# Patient Record
Sex: FEMALE | Race: BLACK OR AFRICAN AMERICAN | Employment: UNEMPLOYED | URBAN - METROPOLITAN AREA
[De-identification: names, ages, dates, MRNs, and addresses within clinical notes are randomized per-mention and may not be internally consistent; named-entity substitution may affect disease eponyms.]

---

## 2017-02-06 ENCOUNTER — HOSPITAL ENCOUNTER (EMERGENCY)
Age: 42
Discharge: HOME | End: 2017-02-06
Attending: EMERGENCY MEDICINE
Payer: SELF-PAY

## 2017-02-06 VITALS
TEMPERATURE: 98.4 F | RESPIRATION RATE: 18 BRPM | SYSTOLIC BLOOD PRESSURE: 144 MMHG | DIASTOLIC BLOOD PRESSURE: 92 MMHG | WEIGHT: 214 LBS | HEART RATE: 89 BPM | BODY MASS INDEX: 39.38 KG/M2 | HEIGHT: 62 IN | OXYGEN SATURATION: 99 %

## 2017-02-06 DIAGNOSIS — L08.9 SKIN INFECTION: Primary | ICD-10-CM

## 2017-02-06 DIAGNOSIS — L24.9 IRRITANT CONTACT DERMATITIS, UNSPECIFIED TRIGGER: ICD-10-CM

## 2017-02-06 DIAGNOSIS — K02.9 INFECTED DENTAL CARIES: ICD-10-CM

## 2017-02-06 DIAGNOSIS — K04.7 INFECTED DENTAL CARIES: ICD-10-CM

## 2017-02-06 PROCEDURE — 99282 EMERGENCY DEPT VISIT SF MDM: CPT

## 2017-02-06 RX ORDER — TRIAMCINOLONE ACETONIDE 1 MG/G
OINTMENT TOPICAL 2 TIMES DAILY
Qty: 30 G | Refills: 0 | Status: SHIPPED | OUTPATIENT
Start: 2017-02-06 | End: 2017-08-13

## 2017-02-06 RX ORDER — DIFLUNISAL 500 MG/1
500 TABLET, FILM COATED ORAL 2 TIMES DAILY
Qty: 20 TAB | Refills: 0 | Status: SHIPPED | OUTPATIENT
Start: 2017-02-06 | End: 2017-08-13

## 2017-02-06 RX ORDER — AMOXICILLIN 875 MG/1
875 TABLET, FILM COATED ORAL 2 TIMES DAILY
Qty: 20 TAB | Refills: 0 | Status: SHIPPED | OUTPATIENT
Start: 2017-02-06 | End: 2017-02-16

## 2017-02-07 NOTE — DISCHARGE INSTRUCTIONS
Dermatitis: Care Instructions  Your Care Instructions  Dermatitis is the general name used for any rash or inflammation of the skin. Different kinds of dermatitis cause different kinds of rashes. Common causes of a rash include new medicines, plants (such as poison oak or poison ivy), heat, and stress. Certain illnesses can also cause a rash. An allergic reaction to something that touches your skin, such as latex, nickel, or poison ivy, is called contact dermatitis. Contact dermatitis may also be caused by something that irritates the skin, such as bleach, a chemical, or soap. These types of rashes cannot be spread from person to person. How long your rash will last depends on what caused it. Rashes may last a few days or months. Follow-up care is a key part of your treatment and safety. Be sure to make and go to all appointments, and call your doctor if you are having problems. It's also a good idea to know your test results and keep a list of the medicines you take. How can you care for yourself at home? · Do not scratch the rash. Cut your nails short, and file them smooth. Or wear gloves if this helps keep you from scratching. · Wash the area with water only. Pat dry. · Put cold, wet cloths on the rash to reduce itching. · Keep cool, and stay out of the sun. · Leave the rash open to the air as much as possible. · If the rash itches, use hydrocortisone cream. Follow the directions on the label. Calamine lotion may help for plant rashes. · Take an over-the-counter antihistamine, such as diphenhydramine (Benadryl) or loratadine (Claritin), to help calm the itching. Read and follow all instructions on the label. · If your doctor prescribed a cream, use it as directed. If your doctor prescribed medicine, take it exactly as directed. When should you call for help?   Call your doctor now or seek immediate medical care if:  · You have symptoms of infection, such as:  ¨ Increased pain, swelling, warmth, or redness. ¨ Red streaks leading from the area. ¨ Pus draining from the area. ¨ A fever. · You have joint pain along with the rash. Watch closely for changes in your health, and be sure to contact your doctor if:  · Your rash is changing or getting worse. · You are not getting better as expected. Where can you learn more? Go to http://chencho-linda.info/. Enter [phone] in the search box to learn more about \"Dermatitis: Care Instructions. \"  Current as of: May 10, 2016  Content Version: 11.1  © 6429-2295 [employer]. Care instructions adapted under license by [employer] (which disclaims liability or warranty for this information). If you have questions about a medical condition or this instruction, always ask your healthcare professional. [place] [address] any warranty or liability for your use of this information. Tooth Decay: Care Instructions  Your Care Instructions    Tooth decay is damage to a tooth caused by plaque. Plaque is a thin film of bacteria that sticks to the teeth above and below the gum line. If plaque isn't removed from the teeth, it can build up and harden into tartar. The bacteria in plaque and tartar use sugars in food to make acids. These acids can cause tooth decay and gum disease. Any part of your tooth can decay, from the roots below the gum line to the chewing surface. Decay can affect the outer layer (enamel) or inner layer (dentin) of your teeth. The deeper the decay, the worse the damage. Untreated tooth decay will get worse and may lead to tooth loss. If you have a small hole (cavity) in your tooth, your dentist can repair it by removing the decay and filling the hole. If you have deeper decay, you may need more treatment. A very badly damaged tooth may have to be removed. Follow-up care is a key part of your treatment and safety.  Be sure to make and go to all appointments, and call your dentist if you are having problems. It's also a good idea to know your test results and keep a list of the medicines you take. How can you care for yourself at home? If you have pain:  · Take an over-the-counter pain medicine, such as acetaminophen (Tylenol), ibuprofen (Advil, Motrin), or naproxen (Aleve). Be safe with medicines. Read and follow all instructions on the label. ¨ Do not take two or more pain medicines at the same time unless the doctor told you to. Many pain medicines have acetaminophen, which is Tylenol. Too much acetaminophen (Tylenol) can be harmful. · Put ice or a cold pack on your cheek over the tooth for 10 to 15 minutes at a time. Put a thin cloth between the ice and your skin. To prevent tooth decay  · Brush teeth twice a day, and floss once a day. Brushing with fluoride toothpaste and flossing may be enough to reverse early decay. · Use a toothbrush with soft, rounded-end bristles and a head that is small enough to reach all parts of your teeth and mouth. Replace your toothbrush every 3 or 4 months. You may also use an electric toothbrush that has rotating and oscillating (back-and-forth) action. · Ask your dentist about having fluoride treatments at the dental office. · Brush your tongue to help get rid of bacteria. · Eat healthy foods that include whole grains, vegetables, and fruits. · Have your teeth cleaned by a professional at least two times a year. · Do not smoke or use smokeless tobacco. Tobacco can make tooth decay worse. When should you call for help? Call your dentist now or seek immediate medical care if:  · You have signs of infection, such as:  ¨ Increased pain, swelling, warmth, or redness. ¨ Red streaks on the gum leading from a tooth. ¨ Pus draining from the gum around a tooth. ¨ A fever. · You have a toothache. Watch closely for changes in your health, and be sure to contact your dentist if you have any problems. Where can you learn more?   Go to http://chencho-linda.info/. Enter A373 in the search box to learn more about \"Tooth Decay: Care Instructions. \"  Current as of: August 9, 2016  Content Version: 11.1  © 4117-8597 [employer]. Care instructions adapted under license by [employer] (which disclaims liability or warranty for this information). If you have questions about a medical condition or this instruction, always ask your healthcare professional. [place] [address] any warranty or liability for your use of this information.

## 2017-02-07 NOTE — ED NOTES
Reports left lower dental pain. First available appt at her dentist Maximo Stahl) is March 12th.   Also reports rash on buttocks x2 days

## 2017-02-07 NOTE — ED PROVIDER NOTES
Patient is a 35 y.o. female presenting with dental problem and rash. The history is provided by the patient. No  was used. Dental Pain    This is a new problem. The problem occurs daily. The pain is located in the left lower mouth. The quality of the pain is aching. The pain is at a severity of 7/10. The pain is moderate. There was no swelling. She has tried nothing for the symptoms. Rash    This is a new problem. The current episode started 2 days ago. The problem has been gradually worsening. There has been no fever. The rash is present on the left buttock. The patient is experiencing no pain. Associated symptoms include itching. She has tried nothing and anti-itch cream for the symptoms. The treatment provided no relief. Past Medical History:   Diagnosis Date    Infectious disease      hepatitis C       History reviewed. No pertinent past surgical history. History reviewed. No pertinent family history. Social History     Social History    Marital status: SINGLE     Spouse name: N/A    Number of children: N/A    Years of education: N/A     Occupational History    Not on file. Social History Main Topics    Smoking status: Current Every Day Smoker    Smokeless tobacco: Not on file    Alcohol use No    Drug use: Yes     Special: Heroin, Cocaine      Comment: Last used 10/28/2015    Sexual activity: Not on file     Other Topics Concern    Not on file     Social History Narrative         ALLERGIES: Lortab [hydrocodone-acetaminophen] and Tramadol    Review of Systems   Constitutional: Negative for fatigue and fever. HENT: Positive for dental problem. Respiratory: Negative for shortness of breath and wheezing. Cardiovascular: Negative for chest pain and palpitations. Gastrointestinal: Negative for abdominal pain. Musculoskeletal: Negative for arthralgias, myalgias, neck pain and neck stiffness. Skin: Positive for itching and rash. Negative for pallor. Neurological: Negative for dizziness, tremors, weakness and headaches. Hematological: Negative for adenopathy. Psychiatric/Behavioral: Negative for agitation and behavioral problems. All other systems reviewed and are negative. Vitals:    02/06/17 1720   BP: (!) 144/92   Pulse: 89   Resp: 18   Temp: 98.4 °F (36.9 °C)   SpO2: 99%   Weight: 97.1 kg (214 lb)   Height: 5' 2\" (1.575 m)            Physical Exam   Constitutional: She is oriented to person, place, and time. She appears well-developed and well-nourished. No distress. HENT:   Head: Normocephalic and atraumatic. Right Ear: External ear normal.   Left Ear: External ear normal.   Nose: Nose normal.   Mouth/Throat: Oropharynx is clear and moist.       Decay to dentine fractured enamel   Eyes: Conjunctivae are normal.   Neck: Normal range of motion. Neck supple. Cardiovascular: Normal rate, regular rhythm and normal heart sounds. Pulmonary/Chest: Effort normal and breath sounds normal. No respiratory distress. She has no wheezes. Abdominal: Soft. Bowel sounds are normal. There is no tenderness. Musculoskeletal: Normal range of motion. Lymphadenopathy:     She has no cervical adenopathy. Neurological: She is alert and oriented to person, place, and time. No cranial nerve deficit. Coordination normal.   Skin: Skin is warm and dry. Rash noted. Psychiatric: She has a normal mood and affect. Her behavior is normal. Judgment and thought content normal.   Nursing note and vitals reviewed.        MDM  Number of Diagnoses or Management Options  Diagnosis management comments: DDX contact dermatitis skin infection tinea corporis infected dental caries dental abscess       Amount and/or Complexity of Data Reviewed  Discuss the patient with other providers: yes      ED Course       Procedures    IMPRESSION  Contact dermatitis   Infected dental caries  PLAN  Amoxicillin  Naprosyn  triamcinalone  Follow up PCP

## 2017-08-13 ENCOUNTER — HOSPITAL ENCOUNTER (EMERGENCY)
Age: 42
Discharge: HOME | End: 2017-08-13
Attending: INTERNAL MEDICINE
Payer: SELF-PAY

## 2017-08-13 VITALS
SYSTOLIC BLOOD PRESSURE: 117 MMHG | BODY MASS INDEX: 32.76 KG/M2 | RESPIRATION RATE: 18 BRPM | HEIGHT: 62 IN | OXYGEN SATURATION: 99 % | HEART RATE: 87 BPM | DIASTOLIC BLOOD PRESSURE: 99 MMHG | WEIGHT: 178 LBS | TEMPERATURE: 98.6 F

## 2017-08-13 DIAGNOSIS — B18.2 CRYOGLOBULINEMIA DUE TO CHRONIC HEPATITIS C (HCC): ICD-10-CM

## 2017-08-13 DIAGNOSIS — D89.1 CRYOGLOBULINEMIA DUE TO CHRONIC HEPATITIS C (HCC): ICD-10-CM

## 2017-08-13 DIAGNOSIS — D50.9 IRON DEFICIENCY ANEMIA, UNSPECIFIED IRON DEFICIENCY ANEMIA TYPE: Primary | ICD-10-CM

## 2017-08-13 DIAGNOSIS — G62.9 NEUROPATHY: ICD-10-CM

## 2017-08-13 DIAGNOSIS — D89.1 CRYOGLOBULINEMIC VASCULITIS (HCC): ICD-10-CM

## 2017-08-13 LAB
ALBUMIN SERPL BCP-MCNC: 2.8 G/DL (ref 3.5–5)
ALBUMIN/GLOB SERPL: 0.7 {RATIO} (ref 1.1–2.2)
ALP SERPL-CCNC: 93 U/L (ref 45–117)
ALT SERPL-CCNC: 17 U/L (ref 12–78)
AMPHET UR QL SCN: NEGATIVE
ANION GAP BLD CALC-SCNC: 6 MMOL/L (ref 5–15)
APPEARANCE UR: CLEAR
AST SERPL W P-5'-P-CCNC: 21 U/L (ref 15–37)
BACTERIA URNS QL MICRO: NEGATIVE /HPF
BARBITURATES UR QL SCN: NEGATIVE
BASOPHILS # BLD AUTO: 0.1 K/UL (ref 0–0.1)
BASOPHILS # BLD: 1 % (ref 0–1)
BENZODIAZ UR QL: NEGATIVE
BILIRUB SERPL-MCNC: 0.4 MG/DL (ref 0.2–1)
BILIRUB UR QL CFM: NEGATIVE
BUN SERPL-MCNC: 9 MG/DL (ref 6–20)
BUN/CREAT SERPL: 11 (ref 12–20)
CALCIUM SERPL-MCNC: 8.2 MG/DL (ref 8.5–10.1)
CANNABINOIDS UR QL SCN: NEGATIVE
CHLORIDE SERPL-SCNC: 102 MMOL/L (ref 97–108)
CO2 SERPL-SCNC: 31 MMOL/L (ref 21–32)
COCAINE UR QL SCN: POSITIVE
COLOR UR: ABNORMAL
CREAT SERPL-MCNC: 0.79 MG/DL (ref 0.55–1.02)
DIFFERENTIAL METHOD BLD: ABNORMAL
DRUG SCRN COMMENT,DRGCM: ABNORMAL
EOSINOPHIL # BLD: 0.3 K/UL (ref 0–0.4)
EOSINOPHIL NFR BLD: 3 % (ref 0–7)
EPITH CASTS URNS QL MICRO: NORMAL /LPF
ERYTHROCYTE [DISTWIDTH] IN BLOOD BY AUTOMATED COUNT: 21.6 % (ref 11.5–14.5)
GLOBULIN SER CALC-MCNC: 4.1 G/DL (ref 2–4)
GLUCOSE SERPL-MCNC: 91 MG/DL (ref 65–100)
GLUCOSE UR STRIP.AUTO-MCNC: NEGATIVE MG/DL
HCT VFR BLD AUTO: 29.4 % (ref 35–47)
HGB BLD-MCNC: 8.7 G/DL (ref 11.5–16)
HGB UR QL STRIP: NEGATIVE
KETONES UR QL STRIP.AUTO: ABNORMAL MG/DL
LEUKOCYTE ESTERASE UR QL STRIP.AUTO: ABNORMAL
LYMPHOCYTES # BLD AUTO: 24 % (ref 12–49)
LYMPHOCYTES # BLD: 2.6 K/UL (ref 0.8–3.5)
MAGNESIUM SERPL-MCNC: 2 MG/DL (ref 1.6–2.4)
MCH RBC QN AUTO: 19.7 PG (ref 26–34)
MCHC RBC AUTO-ENTMCNC: 29.6 G/DL (ref 30–36.5)
MCV RBC AUTO: 66.5 FL (ref 80–99)
METHADONE UR QL: NEGATIVE
MONOCYTES # BLD: 0.8 K/UL (ref 0–1)
MONOCYTES NFR BLD AUTO: 7 % (ref 5–13)
NEUTS SEG # BLD: 6.9 K/UL (ref 1.8–8)
NEUTS SEG NFR BLD AUTO: 65 % (ref 32–75)
NITRITE UR QL STRIP.AUTO: NEGATIVE
OPIATES UR QL: POSITIVE
PCP UR QL: NEGATIVE
PH UR STRIP: 5.5 [PH] (ref 5–8)
PLATELET # BLD AUTO: 400 K/UL (ref 150–400)
POTASSIUM SERPL-SCNC: 2.8 MMOL/L (ref 3.5–5.1)
PROT SERPL-MCNC: 6.9 G/DL (ref 6.4–8.2)
PROT UR STRIP-MCNC: ABNORMAL MG/DL
RBC # BLD AUTO: 4.42 M/UL (ref 3.8–5.2)
RBC #/AREA URNS HPF: NORMAL /HPF (ref 0–5)
RBC MORPH BLD: ABNORMAL
RBC MORPH BLD: ABNORMAL
SODIUM SERPL-SCNC: 139 MMOL/L (ref 136–145)
SP GR UR REFRACTOMETRY: 1.02 (ref 1–1.03)
UROBILINOGEN UR QL STRIP.AUTO: 1 EU/DL (ref 0.2–1)
WBC # BLD AUTO: 10.7 K/UL (ref 3.6–11)
WBC URNS QL MICRO: NORMAL /HPF (ref 0–4)

## 2017-08-13 PROCEDURE — 99283 EMERGENCY DEPT VISIT LOW MDM: CPT

## 2017-08-13 PROCEDURE — 36415 COLL VENOUS BLD VENIPUNCTURE: CPT | Performed by: INTERNAL MEDICINE

## 2017-08-13 PROCEDURE — 83735 ASSAY OF MAGNESIUM: CPT | Performed by: INTERNAL MEDICINE

## 2017-08-13 PROCEDURE — 80053 COMPREHEN METABOLIC PANEL: CPT | Performed by: INTERNAL MEDICINE

## 2017-08-13 PROCEDURE — 74011250637 HC RX REV CODE- 250/637: Performed by: INTERNAL MEDICINE

## 2017-08-13 PROCEDURE — 85025 COMPLETE CBC W/AUTO DIFF WBC: CPT | Performed by: INTERNAL MEDICINE

## 2017-08-13 PROCEDURE — 81001 URINALYSIS AUTO W/SCOPE: CPT | Performed by: INTERNAL MEDICINE

## 2017-08-13 PROCEDURE — 80307 DRUG TEST PRSMV CHEM ANLYZR: CPT | Performed by: INTERNAL MEDICINE

## 2017-08-13 RX ORDER — GABAPENTIN 100 MG/1
100 CAPSULE ORAL 3 TIMES DAILY
Qty: 30 CAP | Refills: 0 | Status: ON HOLD | OUTPATIENT
Start: 2017-08-13 | End: 2021-06-09

## 2017-08-13 RX ORDER — POTASSIUM CHLORIDE 750 MG/1
40 TABLET, FILM COATED, EXTENDED RELEASE ORAL
Status: COMPLETED | OUTPATIENT
Start: 2017-08-13 | End: 2017-08-13

## 2017-08-13 RX ADMIN — POTASSIUM CHLORIDE 40 MEQ: 750 TABLET, FILM COATED, EXTENDED RELEASE ORAL at 11:14

## 2017-08-13 NOTE — ED NOTES
Assumed care of patient. Patient alert and oriented x4. Patient reports bilateral foot pain with swelling and numbness x1 month. Denies injury. Patient states \"I think I have sugar\". Patient denies any history of diabetes. Patient also reports urinary frequency x3 wks. Denies urinary pain. Patient reports daily use of heroine and cocaine and states last use was yesterday. Patient denies any other ocmplaints at this time. Emergency Department Nursing Plan of Care       The Nursing Plan of Care is developed from the Nursing assessment and Emergency Department Attending provider initial evaluation. The plan of care may be reviewed in the ED Provider note.     The Plan of Care was developed with the following considerations:   Patient / Family readiness to learn indicated by:verbalized understanding  Persons(s) to be included in education: patient  Barriers to Learning/Limitations:No    Signed     Rosalind Mancera RN    8/13/2017   9:11 AM

## 2017-08-13 NOTE — ED NOTES
Discharge instructions and 0 prescriptions reviewed with patient. Patient verbalizes understanding and denies any questions. Patient alert and oriented and ambulatory out of ED in no apparent distress. Patient declined wheelchair.

## 2017-08-13 NOTE — ED PROVIDER NOTES
HPI Comments: June Sy is a 38 y.o. female with PMhx significant for Hepatitis C who presents ambulatory to the ED with cc of constant bilateral feet swelling and numbness x 1 month. She states she started having sharp pains in her bilateral feet that intermittently wakes her from sleep, prompting her ED visit. Pt also c/o of orange urine, but states she drinks plenty of fluids and denies any dysuria. She states she [facility] being [facility] for 10 months and notes she had regular blood work done while there. She denies any known hx of DM, gout, or poor circulation. Pt states she is currently on her menstrual cycle and denies any chance of pregnancy as she is not sexually active. Pt reports regular tobacco use, EtOH use, cocaine use, and IV heroin use in bilateral hands and arms. She notes she would like to stop using illicit drugs. PCP: None    There are no other complaints, changes or physical findings at this time. The history is provided by the patient. Past Medical History:   Diagnosis Date    Infectious disease     hepatitis C       History reviewed. No pertinent surgical history. History reviewed. No pertinent family history. Social History     Social History    Marital status: SINGLE     Spouse name: N/A    Number of children: N/A    Years of education: N/A     Occupational History    Not on file. Social History Main Topics    Smoking status: Current Every Day Smoker     Packs/day: 1.50    Smokeless tobacco: Not on file    Alcohol use No    Drug use: Yes     Special: Heroin, Cocaine      Comment: Last used 8/12    Sexual activity: Not on file     Other Topics Concern    Not on file     Social History Narrative         ALLERGIES: Lortab [hydrocodone-acetaminophen] and Tramadol    Review of Systems   Constitutional: Negative. Negative for activity change, appetite change, chills, fatigue, fever and unexpected weight change.    HENT: Negative. Negative for congestion, hearing loss, rhinorrhea, sneezing and voice change. Eyes: Negative. Negative for pain and visual disturbance. Respiratory: Negative. Negative for apnea, cough, choking, chest tightness and shortness of breath. Cardiovascular: Positive for leg swelling (bilateral feet). Negative for chest pain and palpitations. Gastrointestinal: Negative. Negative for abdominal distention, abdominal pain, blood in stool, diarrhea, nausea and vomiting. Genitourinary: Negative for difficulty urinating, dysuria, flank pain, frequency and urgency. No discharge  + orange urine   Musculoskeletal: Positive for arthralgias (bilateral feet). Negative for back pain, myalgias and neck stiffness. Skin: Negative. Negative for color change and rash. Neurological: Positive for numbness (bilateral feet). Negative for dizziness, seizures, syncope, speech difficulty, weakness and headaches. Hematological: Negative for adenopathy. Psychiatric/Behavioral: Negative. Negative for agitation, behavioral problems, dysphoric mood and suicidal ideas. The patient is not nervous/anxious. Vitals:    08/13/17 0904   BP: 133/84   Pulse: 90   Resp: 18   Temp: 98.6 °F (37 °C)   SpO2: 99%   Weight: 80.7 kg (178 lb)   Height: 5' 2\" (1.575 m)            Physical Exam   Constitutional: She is oriented to person, place, and time. She appears well-developed and well-nourished. Disheveled   HENT:   Head: Normocephalic and atraumatic. Mouth/Throat: Oropharynx is clear and moist.   Eyes: Conjunctivae and EOM are normal. Pupils are equal, round, and reactive to light. Sclera anicteric    Neck: Normal range of motion. Neck supple. Cardiovascular: Normal rate, regular rhythm and normal heart sounds. Exam reveals no gallop and no friction rub. No murmur heard. Pulmonary/Chest: Effort normal and breath sounds normal. No respiratory distress. She has no wheezes. She has no rales.    Abdominal: Soft. Bowel sounds are normal. She exhibits no distension. There is no tenderness. There is no rebound and no guarding. Musculoskeletal: Normal range of motion. Bilateral feet swollen and tender with poor hygeine   Lymphadenopathy:     She has no cervical adenopathy. Neurological: She is alert and oriented to person, place, and time. She has normal strength. No cranial nerve deficit or sensory deficit. She displays a negative Romberg sign. Coordination and gait normal.   Skin: Skin is warm and dry. No ecchymosis, no lesion and no rash noted. Rash is not urticarial. She is not diaphoretic. No erythema. Psychiatric: She has a normal mood and affect. Nursing note and vitals reviewed.        MDM  Number of Diagnoses or Management Options  Diagnosis management comments: DDx: cryoglobulinemia, hepatitis, IVDA, anemia, vasculitis, DM       Amount and/or Complexity of Data Reviewed  Clinical lab tests: ordered and reviewed  Review and summarize past medical records: yes    Patient Progress  Patient progress: stable    ED Course       Procedures    LABORATORY TESTS:  Recent Results (from the past 12 hour(s))   URINALYSIS W/ RFLX MICROSCOPIC    Collection Time: 08/13/17  9:51 AM   Result Value Ref Range    Color DARK YELLOW      Appearance CLEAR CLEAR      Specific gravity 1.025 1.003 - 1.030      pH (UA) 5.5 5.0 - 8.0      Protein TRACE (A) NEG mg/dL    Glucose NEGATIVE  NEG mg/dL    Ketone TRACE (A) NEG mg/dL    Blood NEGATIVE  NEG      Urobilinogen 1.0 0.2 - 1.0 EU/dL    Nitrites NEGATIVE  NEG      Leukocyte Esterase SMALL (A) NEG     DRUG SCREEN, URINE    Collection Time: 08/13/17  9:51 AM   Result Value Ref Range    AMPHETAMINES NEGATIVE  NEG      BARBITURATES NEGATIVE  NEG      BENZODIAZEPINE NEGATIVE  NEG      COCAINE POSITIVE (A) NEG      METHADONE NEGATIVE  NEG      OPIATES POSITIVE (A) NEG      PCP(PHENCYCLIDINE) NEGATIVE  NEG      THC (TH-CANNABINOL) NEGATIVE  NEG      Drug screen comment (NOTE) BILIRUBIN, CONFIRM    Collection Time: 08/13/17  9:51 AM   Result Value Ref Range    Bilirubin UA, confirm NEGATIVE  NEG     URINE MICROSCOPIC ONLY    Collection Time: 08/13/17  9:51 AM   Result Value Ref Range    WBC 0-4 0 - 4 /hpf    RBC 0-5 0 - 5 /hpf    Epithelial cells FEW FEW /lpf    Bacteria NEGATIVE  NEG /hpf   METABOLIC PANEL, COMPREHENSIVE    Collection Time: 08/13/17 10:23 AM   Result Value Ref Range    Sodium 139 136 - 145 mmol/L    Potassium 2.8 (L) 3.5 - 5.1 mmol/L    Chloride 102 97 - 108 mmol/L    CO2 31 21 - 32 mmol/L    Anion gap 6 5 - 15 mmol/L    Glucose 91 65 - 100 mg/dL    BUN 9 6 - 20 MG/DL    Creatinine 0.79 0.55 - 1.02 MG/DL    BUN/Creatinine ratio 11 (L) 12 - 20      GFR est AA >60 >60 ml/min/1.73m2    GFR est non-AA >60 >60 ml/min/1.73m2    Calcium 8.2 (L) 8.5 - 10.1 MG/DL    Bilirubin, total 0.4 0.2 - 1.0 MG/DL    ALT (SGPT) 17 12 - 78 U/L    AST (SGOT) 21 15 - 37 U/L    Alk. phosphatase 93 45 - 117 U/L    Protein, total 6.9 6.4 - 8.2 g/dL    Albumin 2.8 (L) 3.5 - 5.0 g/dL    Globulin 4.1 (H) 2.0 - 4.0 g/dL    A-G Ratio 0.7 (L) 1.1 - 2.2     MAGNESIUM    Collection Time: 08/13/17 10:23 AM   Result Value Ref Range    Magnesium 2.0 1.6 - 2.4 mg/dL   CBC WITH AUTOMATED DIFF    Collection Time: 08/13/17 10:23 AM   Result Value Ref Range    WBC 10.7 3.6 - 11.0 K/uL    RBC 4.42 3.80 - 5.20 M/uL    HGB 8.7 (L) 11.5 - 16.0 g/dL    HCT 29.4 (L) 35.0 - 47.0 %    MCV 66.5 (L) 80.0 - 99.0 FL    MCH 19.7 (L) 26.0 - 34.0 PG    MCHC 29.6 (L) 30.0 - 36.5 g/dL    RDW 21.6 (H) 11.5 - 14.5 %    PLATELET 829 906 - 468 K/uL    NEUTROPHILS 65 32 - 75 %    LYMPHOCYTES 24 12 - 49 %    MONOCYTES 7 5 - 13 %    EOSINOPHILS 3 0 - 7 %    BASOPHILS 1 0 - 1 %    ABS. NEUTROPHILS 6.9 1.8 - 8.0 K/UL    ABS. LYMPHOCYTES 2.6 0.8 - 3.5 K/UL    ABS. MONOCYTES 0.8 0.0 - 1.0 K/UL    ABS. EOSINOPHILS 0.3 0.0 - 0.4 K/UL    ABS.  BASOPHILS 0.1 0.0 - 0.1 K/UL    DF SMEAR SCANNED      RBC COMMENTS ANISOCYTOSIS  1+        RBC COMMENTS HYPOCHROMIA  1+           MEDICATIONS GIVEN:  Medications   potassium chloride SR (KLOR-CON 10) tablet 40 mEq (not administered)       IMPRESSION:  1. Iron deficiency anemia, unspecified iron deficiency anemia type    2. Neuropathy ([facility] [address]    3. 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 Discharge home  Current Discharge Medication List      START taking these medications    Details   gabapentin (NEURONTIN) 100 mg capsule Take 1 Cap by mouth three (3) times daily. Qty: 30 Cap, Refills: 0           2. Follow-up Information     Follow up With Details Comments [address], [place] In 2 days If symptoms worsen [address] [place] [address] Rd  [phone]          Return to ED if worse     DISCHARGE NOTE:  11:13 AM  The patient is ready for discharge. The patients signs, symptoms, diagnosis, and instructions for discharge have been discussed and the pt has conveyed their understanding. The patient is to follow up as recommended with PCP or return to the ER should their symptoms worsen. Plan has been discussed and patient has conveyed their agreement. This note is prepared by Juvenal Mei, acting as Scribe for Getachew Rae MD.    Getachew Rae MD: The scribe's documentation has been prepared under my direction and personally reviewed by me in its entirety. I confirm that the note above accurately reflects all work, treatment, procedures, and medical decision making performed by me.

## 2017-08-13 NOTE — DISCHARGE INSTRUCTIONS
Iron Deficiency Anemia: Care Instructions  Your Care Instructions    Anemia means that you do not have enough red blood cells. Red blood cells carry oxygen around your body. When you have anemia, it can make you pale, weak, and tired. Many things can cause anemia. The most common cause is loss of blood. This can happen if you have heavy menstrual periods. It can also happen if you have bleeding in your stomach or bowel. You can also get anemia if you don't have enough iron in your diet or if it's hard for your body to absorb iron. In some cases, pregnancy causes anemia. That's because a pregnant woman needs more iron. Your doctor may do more tests to find the cause of your anemia. If a disease or other health problem is causing it, your doctor will treat that problem. It's important to follow up with your doctor to make sure that your iron level returns to normal.  Follow-up care is a key part of your treatment and safety. Be sure to make and go to all appointments, and call your doctor if you are having problems. It's also a good idea to know your test results and keep a list of the medicines you take. How can you care for yourself at home? · If your doctor recommended iron pills, take them as directed. ¨ Try to take the pills on an empty stomach. You can do this about 1 hour before or 2 hours after meals. But you may need to take iron with food to avoid an upset stomach. ¨ Do not take antacids or drink milk or anything with caffeine within 2 hours of when you take your iron. They can keep your body from absorbing the iron well. ¨ Vitamin C helps your body absorb iron. You may want to take iron pills with a glass of orange juice or some other food high in vitamin C.  ¨ Iron pills may cause stomach problems. These include heartburn, nausea, diarrhea, constipation, and cramps. It can help to drink plenty of fluids and include fruits, vegetables, and fiber in your diet.   ¨ It's normal for iron pills to make your stool a greenish or grayish black. But internal bleeding can also cause dark stool. So it's important to tell your doctor about any color changes. ¨ Call your doctor if you think you are having a problem with your iron pills. Even after you start to feel better, it will take several months for your body to build up its supply of iron. ¨ If you miss a pill, don't take a double dose. ¨ Keep iron pills out of the reach of small children. Too much iron can be very dangerous. · Eat foods with a lot of iron. These include red meat, shellfish, poultry, and eggs. They also include beans, raisins, whole-grain bread, and leafy green vegetables. · Steam your vegetables. This is the best way to prepare them if you want to get as much iron as possible. · Be safe with medicines. Do not take nonsteroidal anti-inflammatory pain relievers unless your doctor tells you to. These include aspirin, naproxen (Aleve), and ibuprofen (Advil, Motrin). · Liquid iron can stain your teeth. But you can mix it with water or juice and drink it with a straw. Then it won't get on your teeth. When should you call for help? Call 911 anytime you think you may need emergency care. For example, call if:  · You passed out (lost consciousness). · You vomit blood or what looks like coffee grounds. · You pass maroon or very bloody stools. Call your doctor now or seek immediate medical care if:  · Your stools are black and look like tar, or they have streaks of blood. · You are dizzy or lightheaded, or you feel like you may faint. Watch closely for changes in your health, and be sure to contact your doctor if:  · Your fatigue and weakness continue or get worse. · You have side effects from taking iron pills, such as nausea, vomiting, constipation, diarrhea, or heartburn. · You do not get better as expected. Where can you learn more? Go to http://hair.info/.   Enter W534 in the search box to learn more about \"Iron Deficiency Anemia: Care Instructions. \"  Current as of: October 13, 2016  Content Version: 11.3  © 6192-3515 [employer]. Care instructions adapted under license by [employer] (which disclaims liability or warranty for this information). If you have questions about a medical condition or this instruction, always ask your healthcare professional. [address] any warranty or liability for your use of this information. Hepatitis C: Care Instructions  Your Care Instructions  Hepatitis C is an infection of the liver caused by a virus. This virus spreads when blood or body fluids from an infected person enter another person's body. This occurs most often when people share needles that have the virus on them. In the past, people got the virus through blood transfusions and organ transplants. But since 1992, all donated blood and organs have been screened for hepatitis C. So getting the virus this way is now very rare. Less often, hepatitis C can spread through sex and sharing items such as razor blades or toothbrushes. Needles used for tattoos and body piercings can also spread the virus. The virus doesn't always cause symptoms. But you may feel tired. And you may have a headache, sore muscles, nausea, and pain in the upper right belly. Other symptoms include yellowish skin and dark urine. Home treatment can help ease symptoms. And your doctor may prescribe antiviral medicine. Long-term infection can lead to severe liver damage. So make sure to go to your follow-up appointments. Follow-up care is a key part of your treatment and safety. Be sure to make and go to all appointments, and call your doctor if you are having problems. It's also a good idea to know your test results and keep a list of the medicines you take. How can you care for yourself at home? · Be safe with medicines.  If your doctor prescribes antiviral medicine, take it exactly as prescribed. Call your doctor if you think you are having a problem with your medicine. · Do not drink alcohol. Alcohol can damage the liver. Tell your doctor if you need help to quit. Counseling, support groups, and sometimes medicines can help you stay sober. · Do not take drugs or herbal medicines. They can make liver problems worse. · Make sure your doctor knows all of the medicines you take. Some medicines, such as acetaminophen (Tylenol), can make liver problems worse. Do not take any new medicines unless your doctor tells you to. This includes over-the-counter medicines. · Maintain a healthy lifestyle. Get plenty of exercise if you feel up to it. Eat a healthy diet. · Drink plenty of fluids, enough so that your urine is light yellow or clear like water. If you have kidney, heart, or liver disease and have to limit fluids, talk with your doctor before you increase the amount of fluids you drink. · Get the vaccines (if you have not already) to protect yourself from hepatitis A and hepatitis B, influenza, and pneumococcus. · The infection can make you itch. Keep cool and stay out of the sun. Try to wear cotton clothing. Talk to your doctor about using over-the-counter medicines for itching. These include diphenhydramine (Benadryl) and chlorpheniramine (Chlor-Trimeton). Follow the instructions on the label. · If you feel depressed, talk to your doctor about treatment. Many people who have long-term illnesses get depressed. Keep in mind that antiviral medicine can make depression worse. To avoid spreading hepatitis C to others  · Tell the people that you live with or have sex with about your illness as soon as you can. · Don't share needles to inject drugs. Don't share other equipment (such as cotton, spoons, and water) with others. Find out if a needle exchange program is available in your area, and use it. Get into a drug treatment program.  · Practice safer sex.  Reduce your number of sex partners if you have more than one. Unless you are in a long-term relationship in which neither partner has sex with anyone else, always use latex condoms when you have sex. · Don't donate blood or blood products, organs, semen, or eggs (ova). · Make sure that all equipment is sterilized if you get a tattoo, have your body pierced, or have acupuncture. · Do not share your personal items. These include razors, toothbrushes, towels, and nail files. · Tell your doctor, dentist, and anyone else who may come in contact with your blood about your illness. · Prevent others from coming in contact with your blood and other body fluids. Keep any cuts, scrapes, or blisters covered. · Wash your hands--and any object that has come in contact with your blood--thoroughly with water and soap. When should you call for help? Call 911 anytime you think you may need emergency care. For example, call if:  · You passed out (lost consciousness). · You have severe trouble breathing. · You feel very confused and can't think clearly. · You vomit blood or what looks like coffee grounds. · You pass maroon or very bloody stools. Call your doctor now or seek immediate medical care if:  · You have any trouble breathing. · You have new bruises or blood spots under your skin. · Your stools are black and tarlike or have streaks of blood. · You have signs of needing more fluids. You have sunken eyes and a dry mouth, and you pass only a little dark urine. Watch closely for changes in your health, and be sure to contact your doctor if:  · You have a nosebleed. · Your gums bleed when you brush your teeth. Where can you learn more? Go to http://chencho-linda.info/. Enter C753 in the search box to learn more about \"Hepatitis C: Care Instructions. \"  Current as of: March 3, 2017  Content Version: 11.3  © 2844-7399 [employer].  Care instructions adapted under license by [employer] (which disclaims liability or warranty for this information). If you have questions about a medical condition or this instruction, always ask your healthcare professional. [address] any warranty or liability for your use of this information. Neuropathic Pain: Care Instructions  Your Care Instructions  Neuropathic pain is caused by pressure on or damage to your nerves. It's often simply called nerve pain. Some people feel this type of pain all the time. For others, it comes and goes. Diabetes, shingles, or an injury can cause nerve pain. Many people say the pain feels sharp, burning, or stabbing. But some people feel it as a dull ache. In some cases, it makes your skin very sensitive. So touch, pressure, and other sensations that did not hurt before may now cause pain. It's important to know that this kind of pain is real and can affect your quality of life. It's also important to know that treatment can help. Treatment includes pain medicines, exercise, and physical therapy. Medicines can help reduce the number of pain signals that travel over the nerves. This can make the painful areas less sensitive. It can also help you sleep better and improve your mood. But medicines are only one part of successful treatment. Most people do best with more than one kind of treatment. Your doctor may recommend that you try cognitive-behavioral therapy and stress management. Or, if needed, you may decide to try to quit smoking, lower your blood pressure, or better control blood sugar. These kinds of healthy changes can also make a difference. If you feel that your treatment is not working, talk to your doctor. And be sure to tell your doctor if you think you might be depressed or anxious. These are common problems that can also be treated. Follow-up care is a key part of your treatment and safety. Be sure to make and go to all appointments, and call your doctor if you are having problems.  It's also a good idea to know your test results and keep a list of the medicines you take. How can you care for yourself at home? · Be safe with medicines. Read and follow all instructions on the label. ¨ If the doctor gave you a prescription medicine for pain, take it as prescribed. ¨ If you are not taking a prescription pain medicine, ask your doctor if you can take an over-the-counter medicine. · Save hard tasks for days when you have less pain. Follow a hard task with an easy task. And remember to take breaks. · Relax, and reduce stress. You may want to try deep breathing or meditation. These can help. · Keep moving. Gentle, daily exercise can help reduce pain. Your doctor or physical therapist can tell you what type of exercise is best for you. This may include walking, swimming, and stationary biking. It may also include stretches and range-of-motion exercises. · Try heat, cold packs, and massage. · Get enough sleep. Constant pain can make you more tired. If the pain makes it hard to sleep, talk with your doctor. · Think positively. Your thoughts can affect your pain. Do fun things to distract yourself from the pain. See a movie, read a book, listen to music, or spend time with a friend. · Keep a pain diary. Try to write down how strong your pain is and what it feels like. Also try to notice and write down how your moods, thoughts, sleep, activities, and medicine affect your pain. These notes can help you and your doctor find the best ways to treat your pain. Reducing constipation caused by pain medicine  Pain medicines often cause constipation. To reduce constipation:  · Include fruits, vegetables, beans, and whole grains in your diet each day. These foods are high in fiber. · Drink plenty of fluids, enough so that your urine is light yellow or clear like water. If you have kidney, heart, or liver disease and have to limit fluids, talk with your doctor before you increase the amount of fluids you drink.   · Get some exercise every day. Build up slowly to 30 to 60 minutes a day on 5 or more days of the week. · Take a fiber supplement, such as Citrucel or Metamucil, every day if needed. Read and follow all instructions on the label. · Schedule time each day for a bowel movement. Having a daily routine may help. Take your time and do not strain when having a bowel movement. · Ask your doctor about a laxative. The goal is to have one easy bowel movement every 1 to 2 days. Do not let constipation go untreated for more than 3 days. When should you call for help? Call your doctor now or seek immediate medical care if:  · You feel sad, anxious, or hopeless for more than a few days. This could mean you are depressed. Depression is common in people who have a lot of pain. But it can be treated. · You have trouble with bowel movements, such as:  ¨ No bowel movement in 3 days. ¨ Blood in the anal area, in your stool, or on the toilet paper. ¨ Diarrhea for more than 24 hours. Watch closely for changes in your health, and be sure to contact your doctor if:  · Your pain is getting worse. · You can't sleep because of pain. · You are very worried or anxious about your pain. · You have trouble taking your pain medicine. · You have any concerns about your pain medicine or its side effects. · You have vomiting or cramps for more than 2 hours. Where can you learn more? Go to http://chencho-linda.info/. Enter I909 in the search box to learn more about \"Neuropathic Pain: Care Instructions. \"  Current as of: October 14, 2016  Content Version: 11.3  © 2499-9975 [employer]. Care instructions adapted under license by [employer] (which disclaims liability or warranty for this information). If you have questions about a medical condition or this instruction, always ask your healthcare professional. [address] any warranty or liability for your use of this information. Iron-Rich Diet: Care Instructions  Your Care Instructions  Your body needs iron to make hemoglobin. Hemoglobin is a substance in red blood cells that carries oxygen from the lungs to cells all through your body. If you do not get enough iron, your body makes fewer and smaller red blood cells. As a result, your body's cells may not get enough oxygen. Adult men need 8 milligrams of iron a day; adult women need 18 milligrams of iron a day. After menopause, women need 8 milligrams of iron a day. A pregnant woman needs 27 milligrams of iron a day. Infants and young children have higher iron needs relative to their size than other age groups. People who have lost blood because of ulcers or heavy menstrual periods may become very low in iron and may develop anemia. Most people can get the iron their bodies need by eating enough of certain iron-rich foods. Your doctor may recommend that you take an iron supplement along with eating an iron-rich diet. Follow-up care is a key part of your treatment and safety. Be sure to make and go to all appointments, and call your doctor if you are having problems. Its also a good idea to know your test results and keep a list of the medicines you take. How can you care for yourself at home? · Make iron-rich foods a part of your daily diet. Iron-rich foods include:  ¨ All meats, such as chicken, beef, lamb, pork, fish, and shellfish. Liver is especially high in iron. ¨ Leafy green vegetables. ¨ Raisins, peas, beans, lentils, barley, and eggs. ¨ Iron-fortified breakfast cereals. · Eat foods with vitamin C along with iron-rich foods. Vitamin C helps you absorb more iron from food. Drink a glass of orange juice or another citrus juice with your food. · Eat meat and vegetables or grains together. The iron in meat helps your body absorb the iron in other foods. Where can you learn more? Go to http://chenhco-linda.info/.   Enter [id number] [id number] in the search box to learn more about \"Iron-Rich Diet: Care Instructions. \"  Current as of: July 26, 2016  Content Version: 11.3  © 7236-7333 [employer]. Care instructions adapted under license by [employer] (which disclaims liability or warranty for this information). If you have questions about a medical condition or this instruction, always ask your healthcare professional. [address] any warranty or liability for your use of this information. Learning About Hepatitis C  What is hepatitis C? Hepatitis C is a liver infection. It is caused by the hepatitis C virus. The virus is spread through infected blood and body fluids. Hepatitis C is often spread when a person shares infected needles used to inject illegal drugs. It also can be spread if a person uses a needle that has infected blood on it. This could happen when you get a tattoo or piercing. Or it can happen when you get a shot in some developing countries where they use needles more than once to give shots. In rare cases, a mother with hepatitis C can spread the virus to her baby at birth. Or a health care worker may accidentally be exposed to blood that is infected with hepatitis C. There is a very small risk of getting the virus through sexual contact. The risk is higher if your sex partner also has HIV or another sexually transmitted infection, or if you have many sex partners. You can't get hepatitis C from casual contact. This is contact such as hugging, kissing, sneezing, coughing, and sharing food or drinks. What happens when you have hepatitis C? Some people who get hepatitis C have it for a short time and then get better. This is called acute hepatitis C. But most people get long-term, or chronic, hepatitis C. This can lead to liver damage as well as cirrhosis, liver cancer, and liver failure. Experts recommend that certain groups of people get tested for the virus.  These include people who have signs of liver disease or have ever shared needles while using illegal drugs. Ask your doctor if testing is right for you. You can also buy a home test called a Home Access Hepatitis C Check kit at most [facility]. If the test shows that you have been exposed to the virus in the past, be sure to talk to your doctor to find out if you have the virus now. What are the symptoms? Most people who get hepatitis C do not have symptoms at first. Symptoms may include:  · Tiredness. · Headache. · Sore muscles. · Nausea. · Pain in the upper right belly. · Yellowing of your skin and eyes (jaundice). · Dark urine. How can you prevent hepatitis C? There is no vaccine to prevent the disease. Anyone who has hepatitis C can spread the virus to someone else. You can take steps to make infection less likely. · Do not share needles to inject drugs. · Follow safety guidelines if you work in a health care setting. Wear protective gloves and clothing. Dispose of needles and other sharp objects properly. · Make sure all instruments and supplies are sterilized if you get a tattoo, have your body pierced, or have acupuncture. To avoid spreading hepatitis C if you have it:  · Do not share needles or other equipment, such as cotton, spoons, and water, if you use needles to inject drugs. · Keep cuts, scrapes, and blisters covered. This will prevent others from coming in contact with your blood and other body fluids. Throw out any blood-soaked items such as used bandages. · Do not donate blood or sperm. · Wash your hands and anything that has come in contact with your blood. Use soap and water. · Do not share your toothbrush, razor, nail clippers, or anything else that might have your blood on it. · Use latex condoms during sex if you have HIV, multiple sex partners, or a sexually transmitted infection. How is hepatitis C treated? · If you have acute hepatitis C, your doctor will probably prescribe medicine.   · If you have chronic hepatitis C, your treatment depends on whether you have liver damage, other health problems you may have, and how much virus is in your body and what type it is. · You will need to see your doctor regularly to have blood tests to check your liver. Follow-up care is a key part of your treatment and safety. Be sure to make and go to all appointments, and call your doctor if you are having problems. It's also a good idea to know your test results and keep a list of the medicines you take. Where can you learn more? Go to http://chencho-linda.info/. Enter C666 in the search box to learn more about \"Learning About Hepatitis C.\"  Current as of: March 3, 2017  Content Version: 11.3  © 7661-9545 [employer]. Care instructions adapted under license by [employer] (which disclaims liability or warranty for this information). If you have questions about a medical condition or this instruction, always ask your healthcare professional. [address] any warranty or liability for your use of this information.

## 2018-09-12 ENCOUNTER — HOSPITAL ENCOUNTER (EMERGENCY)
Age: 43
Discharge: HOME | End: 2018-09-12
Attending: EMERGENCY MEDICINE
Payer: SELF-PAY

## 2018-09-12 ENCOUNTER — APPOINTMENT (OUTPATIENT)
Dept: GENERAL RADIOLOGY | Age: 43
End: 2018-09-12
Attending: PHYSICIAN ASSISTANT
Payer: SELF-PAY

## 2018-09-12 VITALS
DIASTOLIC BLOOD PRESSURE: 96 MMHG | RESPIRATION RATE: 18 BRPM | TEMPERATURE: 98 F | OXYGEN SATURATION: 98 % | HEART RATE: 80 BPM | HEIGHT: 62 IN | SYSTOLIC BLOOD PRESSURE: 125 MMHG | WEIGHT: 125 LBS | BODY MASS INDEX: 23 KG/M2

## 2018-09-12 DIAGNOSIS — M79.89 SWELLING OF TOE OF RIGHT FOOT: Primary | ICD-10-CM

## 2018-09-12 DIAGNOSIS — M87.00 AVASCULAR NECROSIS OF BONE (HCC): ICD-10-CM

## 2018-09-12 PROCEDURE — 73630 X-RAY EXAM OF FOOT: CPT

## 2018-09-12 PROCEDURE — 74011250637 HC RX REV CODE- 250/637: Performed by: PHYSICIAN ASSISTANT

## 2018-09-12 PROCEDURE — 99283 EMERGENCY DEPT VISIT LOW MDM: CPT

## 2018-09-12 RX ORDER — CEPHALEXIN 500 MG/1
500 CAPSULE ORAL 3 TIMES DAILY
Qty: 21 CAP | Refills: 0 | Status: SHIPPED | OUTPATIENT
Start: 2018-09-12 | End: 2018-09-19

## 2018-09-12 RX ORDER — IBUPROFEN 600 MG/1
600 TABLET ORAL
Status: COMPLETED | OUTPATIENT
Start: 2018-09-12 | End: 2018-09-12

## 2018-09-12 RX ORDER — IBUPROFEN 800 MG/1
800 TABLET ORAL
Qty: 20 TAB | Refills: 0 | Status: SHIPPED | OUTPATIENT
Start: 2018-09-12 | End: 2018-09-19

## 2018-09-12 RX ADMIN — IBUPROFEN 600 MG: 600 TABLET ORAL at 12:34

## 2018-09-12 NOTE — DISCHARGE INSTRUCTIONS

## 2018-09-12 NOTE — ED PROVIDER NOTES
EMERGENCY DEPARTMENT HISTORY AND PHYSICAL EXAM    Date: 9/12/2018  Patient Name: Hugo Allen    History of Presenting Illness     Chief Complaint   Patient presents with    Foot Pain     right foot pain x several weeks         History Provided By: Patient      HPI: Hugo Allen is a 37 y.o. female with a PMH of Hepatitis C who presents with acute moderate aching Rt foot pain x 1 week w/ NKI. PT endorses pain localized to Rt 5th toe and nodule on sole of foot. No modifying factors or medications. Denies fever, chills, n/v, rash, wound, numbness/tingling, LROM. PCP: None    Current Outpatient Prescriptions   Medication Sig Dispense Refill    cephALEXin (KEFLEX) 500 mg capsule Take 1 Cap by mouth three (3) times daily for 7 days. 21 Cap 0    ibuprofen (MOTRIN) 800 mg tablet Take 1 Tab by mouth every six (6) hours as needed for Pain for up to 7 days. 20 Tab 0    gabapentin (NEURONTIN) 100 mg capsule Take 1 Cap by mouth three (3) times daily. 30 Cap 0       Past History     Past Medical History:  Past Medical History:   Diagnosis Date    Hepatitis C     Infectious disease     hepatitis C       Past Surgical History:  History reviewed. No pertinent surgical history. Family History:  History reviewed. No pertinent family history. Social History:  Social History   Substance Use Topics    Smoking status: Current Every Day Smoker     Packs/day: 1.50    Smokeless tobacco: Never Used    Alcohol use No       Allergies: Allergies   Allergen Reactions    Lortab [Hydrocodone-Acetaminophen] Rash     Pt states she had Lortab recently and did not have a reaction. Pt denies    Tramadol Other (comments)     Headache         Review of Systems   Review of Systems   Constitutional: Negative for activity change, appetite change, chills, diaphoresis, fatigue and fever. HENT: Negative. Eyes: Negative. Respiratory: Negative. Negative for cough and shortness of breath. Cardiovascular: Negative. Negative for chest pain and leg swelling. Gastrointestinal: Negative. Negative for abdominal pain, diarrhea, nausea and vomiting. Genitourinary: Negative. Musculoskeletal: Positive for arthralgias. Negative for back pain, joint swelling and neck pain. Skin: Negative. Negative for color change, pallor, rash and wound. Neurological: Negative. Negative for numbness. Psychiatric/Behavioral: Negative. Physical Exam     Vitals:    09/12/18 1204   BP: (!) 125/96   Pulse: 80   Resp: 18   Temp: 98 °F (36.7 °C)   SpO2: 98%   Weight: 56.7 kg (125 lb)   Height: 5' 2\" (1.575 m)     Physical Exam   Constitutional: She is oriented to person, place, and time. She appears well-developed and well-nourished. No distress. HENT:   Head: Normocephalic and atraumatic. Right Ear: Hearing and external ear normal.   Left Ear: Hearing and external ear normal.   Nose: Nose normal.   Eyes: Conjunctivae and EOM are normal. Pupils are equal, round, and reactive to light. Neck: Normal range of motion. Cardiovascular:   Pulses:       Dorsalis pedis pulses are 2+ on the right side, and 2+ on the left side. Pulmonary/Chest: Effort normal. No respiratory distress. Musculoskeletal: Normal range of motion. Right ankle: Normal.        Right foot: There is tenderness, bony tenderness and swelling (5th toe; mild). There is normal range of motion, normal capillary refill, no crepitus, no deformity and no laceration. Left foot: Normal.        Feet:    3 cm deep non mobile nodule to sole of Rt foot. No fluctuance, erythema, warmth, induration, streaking. +TTP. Localized TTP to Rt 5th toe w/ mild erythema and swelling,. No streaking, wound, fluctuance, induration, warmth. NVI. Neurological: She is alert and oriented to person, place, and time. Skin: Skin is warm, dry and intact. She is not diaphoretic. Psychiatric: She has a normal mood and affect.  Her behavior is normal. Judgment and thought content normal.   Nursing note and vitals reviewed. Diagnostic Study Results     Labs -   No results found for this or any previous visit (from the past 12 hour(s)). Radiologic Studies -   XR FOOT RT MIN 3 V   Final Result        Initial Result:     Impression:     IMPRESSION:    AVN of the right first metatarsal head with partial collapse. Nonspecific soft tissue swelling of the right fifth digit.               Narrative:     EXAM:  XR FOOT RT MIN 3 V    INDICATION:   unknown trauma; 5th toe pain and nodule to middle sole. COMPARISON:  None. FINDINGS:  Three views of the right foot demonstrate no fracture. There is soft  tissue swelling associated with the fifth digit. There is irregularity of the  distal aspect of the first metatarsal with partial collapse. A small plantar  spur is present. CT Results  (Last 48 hours)    None        CXR Results  (Last 48 hours)    None            Medical Decision Making   I am the first provider for this patient. I reviewed the vital signs, available nursing notes, past medical history, past surgical history, family history and social history. Vital Signs-Reviewed the patient's vital signs. Records Reviewed: Nursing Notes, Old Medical Records and Previous Radiology Studies    ED Course:     Disposition:    DISCHARGE NOTE:   1:04 PM      Care plan outlined and precautions discussed. Patient has no new complaints, changes, or physical findings. Results of xray and exan were reviewed with the patient. All medications were reviewed with the patient; will d/c home with ibuprofen and keflex. All of pt's questions and concerns were addressed. Patient was instructed and agrees to follow up with Podiatry, as well as to return to the ED upon further deterioration. Patient is ready to go home.     Follow-up Information     Follow up With Details Comments [address], DPM Schedule an appointment as soon as possible for a visit in 3 days As needed, If symptoms worsen [address] [facility] [address]  [phone]            Current Discharge Medication List      START taking these medications    Details   cephALEXin (KEFLEX) 500 mg capsule Take 1 Cap by mouth three (3) times daily for 7 days. Qty: 21 Cap, Refills: 0      ibuprofen (MOTRIN) 800 mg tablet Take 1 Tab by mouth every six (6) hours as needed for Pain for up to 7 days. Qty: 20 Tab, Refills: 0         CONTINUE these medications which have NOT CHANGED    Details   gabapentin (NEURONTIN) 100 mg capsule Take 1 Cap by mouth three (3) times daily. Qty: 30 Cap, Refills: 0             Provider Notes (Medical Decision Making):   DDX; sprain, strain, contusion, fx, dislocation, cyst, neoplasm, cellulitis    Procedures:  Procedures        Diagnosis     Clinical Impression:   1. Swelling of toe of right foot    2.  Avascular necrosis of bone ([address]

## 2018-09-12 NOTE — ED NOTES
Patient presents to the ED with c/o right foot pain x3 days, Pt denies any injury or trauma. Pt denies taking any medications. Pt is alert and oriented. Pt skin is warm and dry. Emergency Department Nursing Plan of Care       The Nursing Plan of Care is developed from the Nursing assessment and Emergency Department Attending provider initial evaluation. The plan of care may be reviewed in the ED Provider note.     The Plan of Care was developed with the following considerations:   Patient / Family readiness to learn indicated by:verbalized understanding  Persons(s) to be included in education: patient  Barriers to Learning/Limitations:No    Signed     Samantha Palacios    9/12/2018   12:29 PM

## 2019-05-17 ENCOUNTER — APPOINTMENT (OUTPATIENT)
Dept: GENERAL RADIOLOGY | Age: 44
End: 2019-05-17
Attending: EMERGENCY MEDICINE
Payer: SELF-PAY

## 2019-05-17 ENCOUNTER — HOSPITAL ENCOUNTER (EMERGENCY)
Age: 44
Discharge: HOME | End: 2019-05-17
Attending: EMERGENCY MEDICINE
Payer: SELF-PAY

## 2019-05-17 VITALS
WEIGHT: 130.5 LBS | BODY MASS INDEX: 24.01 KG/M2 | TEMPERATURE: 98 F | HEART RATE: 85 BPM | SYSTOLIC BLOOD PRESSURE: 146 MMHG | DIASTOLIC BLOOD PRESSURE: 93 MMHG | OXYGEN SATURATION: 96 % | HEIGHT: 62 IN | RESPIRATION RATE: 18 BRPM

## 2019-05-17 DIAGNOSIS — R22.9 SOFT TISSUE SWELLING: ICD-10-CM

## 2019-05-17 DIAGNOSIS — M79.672 PAIN IN BOTH FEET: ICD-10-CM

## 2019-05-17 DIAGNOSIS — M87.00 AVN (AVASCULAR NECROSIS OF BONE) (HCC): Primary | ICD-10-CM

## 2019-05-17 DIAGNOSIS — M79.671 PAIN IN BOTH FEET: ICD-10-CM

## 2019-05-17 PROCEDURE — 96372 THER/PROPH/DIAG INJ SC/IM: CPT

## 2019-05-17 PROCEDURE — 73630 X-RAY EXAM OF FOOT: CPT

## 2019-05-17 PROCEDURE — 74011250636 HC RX REV CODE- 250/636

## 2019-05-17 PROCEDURE — 99282 EMERGENCY DEPT VISIT SF MDM: CPT

## 2019-05-17 RX ORDER — KETOROLAC TROMETHAMINE 30 MG/ML
INJECTION, SOLUTION INTRAMUSCULAR; INTRAVENOUS
Status: COMPLETED
Start: 2019-05-17 | End: 2019-05-17

## 2019-05-17 RX ORDER — IBUPROFEN 800 MG/1
800 TABLET ORAL
Qty: 20 TAB | Refills: 0 | Status: SHIPPED | OUTPATIENT
Start: 2019-05-17 | End: 2019-05-24

## 2019-05-17 RX ORDER — KETOROLAC TROMETHAMINE 30 MG/ML
30 INJECTION, SOLUTION INTRAMUSCULAR; INTRAVENOUS
Status: COMPLETED | OUTPATIENT
Start: 2019-05-17 | End: 2019-05-17

## 2019-05-17 RX ADMIN — KETOROLAC TROMETHAMINE 30 MG: 30 INJECTION, SOLUTION INTRAMUSCULAR; INTRAVENOUS at 02:35

## 2019-05-17 NOTE — ED NOTES
Pt arrived to ED with c/o knot on R foot arch. Pt reports she had this knot on her R foot back in sept and was suppose to f/u with podiatry but was incarcerated. Pt c/o increased size and swelling. Pt is in no acute distress. Will continue to monitor. See nursing assessment. Safety precautions in place; call light within reach. Emergency Department Nursing Plan of Care       The Nursing Plan of Care is developed from the Nursing assessment and Emergency Department Attending provider initial evaluation. The plan of care may be reviewed in the ED Provider note.     The Plan of Care was developed with the following considerations:   Patient / Family readiness to learn indicated by:verbalized understanding  Persons(s) to be included in education: patient  Barriers to Learning/Limitations:No    Signed     Manoj Carter RN    5/17/2019   1:01 AM

## 2019-05-17 NOTE — ED TRIAGE NOTES
Pt comes in with R foot pain the arch. Pt reports that \"there was a knot in there and it got bigger since September of last year. \" Pt also reports a similar knot in the L arch, but not as big as the R.

## 2019-05-17 NOTE — DISCHARGE INSTRUCTIONS

## 2019-05-17 NOTE — ED PROVIDER NOTES
76-year-old female with a history of hep C who was recently released from retirement presents with persistent foot pain. She states it began in the right foot. She was seen for the same in September and referred to a podiatrist.  Before she could go to her follow-up appointment with podiatry she was incarcerated. The pain has been getting gradually worse since then. The pain is located along the midportion of her instep/medial foot. Since her last visit she has developed a similar pain in the left foot. She denies fevers, rash, new injury. Past Medical History:   Diagnosis Date    Hepatitis C     Infectious disease     hepatitis C       History reviewed. No pertinent surgical history. History reviewed. No pertinent family history.     Social History     Socioeconomic History    Marital status: SINGLE     Spouse name: Not on file    Number of children: Not on file    Years of education: Not on file    Highest education level: Not on file   Occupational History    Not on file   Social Needs    Financial resource strain: Not on file    Food insecurity:     Worry: Not on file     Inability: Not on file    Transportation needs:     Medical: Not on file     Non-medical: Not on file   Tobacco Use    Smoking status: Current Every Day Smoker     Packs/day: 1.50    Smokeless tobacco: Never Used   Substance and Sexual Activity    Alcohol use: No    Drug use: Yes     Types: Heroin, Cocaine     Comment: Last used 8/12    Sexual activity: Not on file   Lifestyle    Physical activity:     Days per week: Not on file     Minutes per session: Not on file    Stress: Not on file   Relationships    Social connections:     Talks on phone: Not on file     Gets together: Not on file     Attends [employer] service: Not on file     Active member of club or organization: Not on file     Attends meetings of clubs or organizations: Not on file     Relationship status: Not on file    Intimate partner violence: Fear of current or ex partner: Not on file     Emotionally abused: Not on file     Physically abused: Not on file     Forced sexual activity: Not on file   Other Topics Concern    Not on file   Social History Narrative    Not on file         ALLERGIES: Lortab [hydrocodone-acetaminophen] and Tramadol    Review of Systems   Constitutional: Negative. Negative for chills, fever and unexpected weight change. HENT: Negative. Negative for congestion and trouble swallowing. Eyes: Negative for discharge. Respiratory: Negative. Negative for cough, chest tightness and shortness of breath. Cardiovascular: Negative. Negative for chest pain. Gastrointestinal: Negative. Negative for abdominal distention, abdominal pain, constipation, diarrhea and nausea. Endocrine: Negative. Genitourinary: Negative. Negative for difficulty urinating, dysuria, frequency and urgency. Musculoskeletal: Positive for arthralgias and myalgias. Skin: Negative. Negative for color change. Allergic/Immunologic: Negative. Neurological: Negative. Negative for dizziness, speech difficulty and headaches. Hematological: Negative. Psychiatric/Behavioral: Negative. Negative for agitation and confusion. All other systems reviewed and are negative. Vitals:    05/17/19 0026   BP: (!) 146/93   Pulse: 85   Resp: 18   Temp: 98 °F (36.7 °C)   SpO2: 96%   Weight: 59.2 kg (130 lb 8 oz)   Height: 5' 2\" (1.575 m)            Physical Exam   Constitutional: She is oriented to person, place, and time. She appears well-developed and well-nourished. HENT:   Head: Normocephalic and atraumatic. Eyes: Conjunctivae and EOM are normal.   Neck: Neck supple. Cardiovascular: Normal rate, regular rhythm and intact distal pulses. Pulmonary/Chest: Effort normal. No respiratory distress. Abdominal: Soft. There is no tenderness. Musculoskeletal: Normal range of motion. She exhibits no deformity.    Palpable soft tissue swelling in the mid portion of the instep in both of her feet, right greater than left. No localized bony tenderness. No deformity, no ecchymosis. Neurological: She is alert and oriented to person, place, and time. Skin: Skin is warm and dry. Psychiatric: She has a normal mood and affect. Her behavior is normal. Thought content normal.   Vitals reviewed. [facility]  ED Course as of May 17 0706   Fri May 17, 2019   0216 Prior x-ray reviewed. Patient was found to have metatarsal avascular necrosis and a plantar spur.    [SS]   0303 Patient is up and walking around the ED with no limp. We had a discussion about her diagnosis. X-ray has not changed, however she states the soft tissue swelling has increased. Since her release from [employer] she has been up and walking significantly more. Could be related to this. [SS]      ED Course User Index  [SS] Rosita Saini MD       Procedures    LABORATORY TESTS:  No results found for this or any previous visit (from the past 12 hour(s)). IMAGING RESULTS:  XR FOOT LT MIN 3 V   Final Result   IMPRESSION: Soft tissue swelling. No acute findings. XR FOOT RT MIN 3 V   Final Result   IMPRESSION: Soft tissue swelling. MEDICATIONS GIVEN:  Medications   ketorolac (TORADOL) injection 30 mg (30 mg IntraMUSCular Given 5/17/19 0235)       IMPRESSION:  1. AVN (avascular necrosis of bone) (HCC)    2. Pain in both feet    3. Soft tissue swelling        PLAN:  1. Discharge Medication List as of 5/17/2019  3:06 AM      START taking these medications    Details   ibuprofen (MOTRIN) 800 mg tablet Take 1 Tab by mouth every six (6) hours as needed for Pain for up to 7 days. , Print, Disp-20 Tab, R-0         CONTINUE these medications which have NOT CHANGED    Details   gabapentin (NEURONTIN) 100 mg capsule Take 1 Cap by mouth three (3) times daily. , Normal, Disp-30 Cap, R-0           2.    Follow-up Information     Follow up With Specialties Details Why [address] [employer],[address] [facility]  Schedule an appointment as soon as possible for a visit  [address] [facility] [place], [address] [facility] [address] [id number] [phone]    [facility] EMERGENCY DEPT Emergency Medicine  As needed, If symptoms worsen [address] [facility] [address]    Jacky Epps DPM Podiatry Schedule an appointment as soon as possible for a visit  [address]  [phone]          Return to ED if worse

## 2021-06-09 ENCOUNTER — DOCUMENTATION ONLY (OUTPATIENT)
Dept: CARDIOLOGY CLINIC | Age: 46
End: 2021-06-09

## 2021-06-09 ENCOUNTER — APPOINTMENT (OUTPATIENT)
Dept: NON INVASIVE DIAGNOSTICS | Age: 46
End: 2021-06-09
Attending: EMERGENCY MEDICINE
Payer: MEDICAID

## 2021-06-09 ENCOUNTER — HOSPITAL ENCOUNTER (OUTPATIENT)
Age: 46
Setting detail: OBSERVATION
Discharge: HOME | End: 2021-06-10
Attending: EMERGENCY MEDICINE | Admitting: STUDENT IN AN ORGANIZED HEALTH CARE EDUCATION/TRAINING PROGRAM
Payer: MEDICAID

## 2021-06-09 ENCOUNTER — APPOINTMENT (OUTPATIENT)
Dept: GENERAL RADIOLOGY | Age: 46
End: 2021-06-09
Attending: EMERGENCY MEDICINE
Payer: MEDICAID

## 2021-06-09 DIAGNOSIS — K59.00 CONSTIPATION, UNSPECIFIED CONSTIPATION TYPE: ICD-10-CM

## 2021-06-09 DIAGNOSIS — I25.9 CHEST PAIN DUE TO MYOCARDIAL ISCHEMIA, UNSPECIFIED ISCHEMIC CHEST PAIN TYPE: Primary | ICD-10-CM

## 2021-06-09 DIAGNOSIS — F14.10 COCAINE ABUSE (HCC): ICD-10-CM

## 2021-06-09 DIAGNOSIS — Z72.0 TOBACCO ABUSE: ICD-10-CM

## 2021-06-09 LAB
ALBUMIN SERPL-MCNC: 3 G/DL (ref 3.5–5)
ALBUMIN/GLOB SERPL: 0.7 {RATIO} (ref 1.1–2.2)
ALP SERPL-CCNC: 147 U/L (ref 45–117)
ALT SERPL-CCNC: 23 U/L (ref 12–78)
AMPHET UR QL SCN: NEGATIVE
ANION GAP SERPL CALC-SCNC: 9 MMOL/L (ref 5–15)
APPEARANCE UR: CLEAR
AST SERPL-CCNC: 34 U/L (ref 15–37)
BACTERIA URNS QL MICRO: NEGATIVE /HPF
BARBITURATES UR QL SCN: NEGATIVE
BASOPHILS # BLD: 0.1 K/UL (ref 0–0.1)
BASOPHILS NFR BLD: 1 % (ref 0–1)
BENZODIAZ UR QL: NEGATIVE
BILIRUB SERPL-MCNC: 0.4 MG/DL (ref 0.2–1)
BILIRUB UR QL CFM: NEGATIVE
BUN SERPL-MCNC: 11 MG/DL (ref 6–20)
BUN/CREAT SERPL: 14 (ref 12–20)
CALCIUM SERPL-MCNC: 8.2 MG/DL (ref 8.5–10.1)
CANNABINOIDS UR QL SCN: NEGATIVE
CHLORIDE SERPL-SCNC: 105 MMOL/L (ref 97–108)
CO2 SERPL-SCNC: 27 MMOL/L (ref 21–32)
COCAINE UR QL SCN: POSITIVE
COLOR UR: ABNORMAL
CREAT SERPL-MCNC: 0.81 MG/DL (ref 0.55–1.02)
DIFFERENTIAL METHOD BLD: ABNORMAL
DRUG SCRN COMMENT,DRGCM: ABNORMAL
ECHO AO ROOT DIAM: 2.4 CM
ECHO AV AREA PLAN: 2.78 CM2
ECHO EST RA PRESSURE: 5 MMHG
ECHO LA AREA 4C: 24.81 CM2
ECHO LA MAJOR AXIS: 3.62 CM
ECHO LA MINOR AXIS: 1.95 CM
ECHO LA VOL 4C: 81.43 ML (ref 22–52)
ECHO LA VOLUME INDEX A4C: 43.78 ML/M2 (ref 16–28)
ECHO LV EDV A4C: 69.88 ML
ECHO LV EDV INDEX A4C: 37.6 ML/M2
ECHO LV EJECTION FRACTION A4C: 66 PERCENT
ECHO LV ESV A4C: 23.87 ML
ECHO LV ESV INDEX A4C: 12.8 ML/M2
ECHO LV INTERNAL DIMENSION DIASTOLIC: 4.68 CM (ref 3.9–5.3)
ECHO LV INTERNAL DIMENSION SYSTOLIC: 2.27 CM
ECHO LV IVSD: 1.28 CM (ref 0.6–0.9)
ECHO LV MASS 2D: 228.5 G (ref 67–162)
ECHO LV MASS INDEX 2D: 122.8 G/M2 (ref 43–95)
ECHO LV POSTERIOR WALL DIASTOLIC: 1.26 CM (ref 0.6–0.9)
ECHO LVOT DIAM: 1.98 CM
ECHO LVOT PEAK GRADIENT: 5.79 MMHG
ECHO LVOT PEAK VELOCITY: 120.15 CM/S
ECHO MV A VELOCITY: 68.9 CM/S
ECHO MV AREA PHT: 3.22 CM2
ECHO MV AREA PLAN: 5.08 CM2
ECHO MV E DECELERATION TIME (DT): 181.16 MS
ECHO MV E VELOCITY: 43.49 CM/S
ECHO MV E/A RATIO: 0.63
ECHO MV MAX VELOCITY: 109.42 CM/S
ECHO MV MEAN GRADIENT: 1.14 MMHG
ECHO MV PEAK GRADIENT: 4.79 MMHG
ECHO MV PRESSURE HALF TIME (PHT): 68.32 MS
ECHO MV VTI: 24.53 CM
ECHO PV PEAK INSTANTANEOUS GRADIENT SYSTOLIC: 3.27 MMHG
ECHO PV REGURGITANT MAX VELOCITY: 90.42 CM/S
ECHO RA AREA 4C: 12.03 CM2
ECHO RIGHT VENTRICULAR SYSTOLIC PRESSURE (RVSP): 24.57 MMHG
ECHO TV REGURGITANT MAX VELOCITY: 219.17 CM/S
ECHO TV REGURGITANT PEAK GRADIENT: 19.57 MMHG
EOSINOPHIL # BLD: 0.2 K/UL (ref 0–0.4)
EOSINOPHIL NFR BLD: 2 % (ref 0–7)
EPITH CASTS URNS QL MICRO: ABNORMAL /LPF
ERYTHROCYTE [DISTWIDTH] IN BLOOD BY AUTOMATED COUNT: 20.2 % (ref 11.5–14.5)
GLOBULIN SER CALC-MCNC: 4.5 G/DL (ref 2–4)
GLUCOSE SERPL-MCNC: 114 MG/DL (ref 65–100)
GLUCOSE UR STRIP.AUTO-MCNC: NEGATIVE MG/DL
HCG UR QL: NEGATIVE
HCT VFR BLD AUTO: 40.5 % (ref 35–47)
HGB BLD-MCNC: 12 G/DL (ref 11.5–16)
HGB UR QL STRIP: NEGATIVE
IMM GRANULOCYTES # BLD AUTO: 0 K/UL (ref 0–0.04)
IMM GRANULOCYTES NFR BLD AUTO: 0 % (ref 0–0.5)
KETONES UR QL STRIP.AUTO: NEGATIVE MG/DL
LEUKOCYTE ESTERASE UR QL STRIP.AUTO: NEGATIVE
LYMPHOCYTES # BLD: 3 K/UL (ref 0.8–3.5)
LYMPHOCYTES NFR BLD: 25 % (ref 12–49)
MCH RBC QN AUTO: 23.2 PG (ref 26–34)
MCHC RBC AUTO-ENTMCNC: 29.6 G/DL (ref 30–36.5)
MCV RBC AUTO: 78.2 FL (ref 80–99)
METHADONE UR QL: POSITIVE
MONOCYTES # BLD: 0.8 K/UL (ref 0–1)
MONOCYTES NFR BLD: 7 % (ref 5–13)
NEUTS SEG # BLD: 7.8 K/UL (ref 1.8–8)
NEUTS SEG NFR BLD: 65 % (ref 32–75)
NITRITE UR QL STRIP.AUTO: NEGATIVE
NRBC # BLD: 0 K/UL (ref 0–0.01)
NRBC BLD-RTO: 0 PER 100 WBC
OPIATES UR QL: POSITIVE
PCP UR QL: NEGATIVE
PH UR STRIP: 5.5 [PH] (ref 5–8)
PLATELET # BLD AUTO: 265 K/UL (ref 150–400)
POTASSIUM SERPL-SCNC: 4.3 MMOL/L (ref 3.5–5.1)
PROT SERPL-MCNC: 7.5 G/DL (ref 6.4–8.2)
PROT UR STRIP-MCNC: NEGATIVE MG/DL
RBC # BLD AUTO: 5.18 M/UL (ref 3.8–5.2)
RBC #/AREA URNS HPF: ABNORMAL /HPF (ref 0–5)
RBC MORPH BLD: ABNORMAL
SODIUM SERPL-SCNC: 141 MMOL/L (ref 136–145)
SP GR UR REFRACTOMETRY: >1.03 (ref 1–1.03)
TROPONIN I SERPL-MCNC: <0.05 NG/ML
UA: UC IF INDICATED,UAUC: ABNORMAL
UROBILINOGEN UR QL STRIP.AUTO: 1 EU/DL (ref 0.2–1)
WBC # BLD AUTO: 11.9 K/UL (ref 3.6–11)
WBC URNS QL MICRO: ABNORMAL /HPF (ref 0–4)

## 2021-06-09 PROCEDURE — 80053 COMPREHEN METABOLIC PANEL: CPT

## 2021-06-09 PROCEDURE — 84484 ASSAY OF TROPONIN QUANT: CPT

## 2021-06-09 PROCEDURE — 99285 EMERGENCY DEPT VISIT HI MDM: CPT

## 2021-06-09 PROCEDURE — 74011250637 HC RX REV CODE- 250/637: Performed by: STUDENT IN AN ORGANIZED HEALTH CARE EDUCATION/TRAINING PROGRAM

## 2021-06-09 PROCEDURE — 80307 DRUG TEST PRSMV CHEM ANLYZR: CPT

## 2021-06-09 PROCEDURE — 78452 HT MUSCLE IMAGE SPECT MULT: CPT

## 2021-06-09 PROCEDURE — 80061 LIPID PANEL: CPT

## 2021-06-09 PROCEDURE — 93005 ELECTROCARDIOGRAM TRACING: CPT

## 2021-06-09 PROCEDURE — 83036 HEMOGLOBIN GLYCOSYLATED A1C: CPT

## 2021-06-09 PROCEDURE — 99218 HC RM OBSERVATION: CPT

## 2021-06-09 PROCEDURE — 93017 CV STRESS TEST TRACING ONLY: CPT

## 2021-06-09 PROCEDURE — 81025 URINE PREGNANCY TEST: CPT

## 2021-06-09 PROCEDURE — 85025 COMPLETE CBC W/AUTO DIFF WBC: CPT

## 2021-06-09 PROCEDURE — 71045 X-RAY EXAM CHEST 1 VIEW: CPT

## 2021-06-09 PROCEDURE — 36415 COLL VENOUS BLD VENIPUNCTURE: CPT

## 2021-06-09 PROCEDURE — 93306 TTE W/DOPPLER COMPLETE: CPT

## 2021-06-09 PROCEDURE — 93306 TTE W/DOPPLER COMPLETE: CPT | Performed by: INTERNAL MEDICINE

## 2021-06-09 PROCEDURE — 81001 URINALYSIS AUTO W/SCOPE: CPT

## 2021-06-09 PROCEDURE — 99219 PR INITIAL OBSERVATION CARE/DAY 50 MINUTES: CPT | Performed by: NURSE PRACTITIONER

## 2021-06-09 PROCEDURE — 74011250637 HC RX REV CODE- 250/637: Performed by: EMERGENCY MEDICINE

## 2021-06-09 RX ORDER — POLYETHYLENE GLYCOL 3350 17 G/17G
17 POWDER, FOR SOLUTION ORAL DAILY PRN
Status: DISCONTINUED | OUTPATIENT
Start: 2021-06-09 | End: 2021-06-10 | Stop reason: HOSPADM

## 2021-06-09 RX ORDER — METHADONE HYDROCHLORIDE 10 MG/ML
115 CONCENTRATE ORAL DAILY
COMMUNITY

## 2021-06-09 RX ORDER — KETOROLAC TROMETHAMINE 30 MG/ML
30 INJECTION, SOLUTION INTRAMUSCULAR; INTRAVENOUS
Status: DISCONTINUED | OUTPATIENT
Start: 2021-06-09 | End: 2021-06-09

## 2021-06-09 RX ORDER — SODIUM CHLORIDE 0.9 % (FLUSH) 0.9 %
5-40 SYRINGE (ML) INJECTION AS NEEDED
Status: DISCONTINUED | OUTPATIENT
Start: 2021-06-09 | End: 2021-06-10 | Stop reason: HOSPADM

## 2021-06-09 RX ORDER — GUAIFENESIN 100 MG/5ML
324 LIQUID (ML) ORAL
Status: COMPLETED | OUTPATIENT
Start: 2021-06-09 | End: 2021-06-09

## 2021-06-09 RX ORDER — SODIUM CHLORIDE 0.9 % (FLUSH) 0.9 %
5-40 SYRINGE (ML) INJECTION EVERY 8 HOURS
Status: DISCONTINUED | OUTPATIENT
Start: 2021-06-09 | End: 2021-06-10 | Stop reason: HOSPADM

## 2021-06-09 RX ORDER — ACETAMINOPHEN 325 MG/1
650 TABLET ORAL
Status: DISCONTINUED | OUTPATIENT
Start: 2021-06-09 | End: 2021-06-10 | Stop reason: HOSPADM

## 2021-06-09 RX ORDER — ONDANSETRON 2 MG/ML
4 INJECTION INTRAMUSCULAR; INTRAVENOUS
Status: DISCONTINUED | OUTPATIENT
Start: 2021-06-09 | End: 2021-06-10

## 2021-06-09 RX ORDER — ONDANSETRON 4 MG/1
4 TABLET, ORALLY DISINTEGRATING ORAL
Status: DISCONTINUED | OUTPATIENT
Start: 2021-06-09 | End: 2021-06-10

## 2021-06-09 RX ORDER — GUAIFENESIN 100 MG/5ML
81 LIQUID (ML) ORAL DAILY
Status: DISCONTINUED | OUTPATIENT
Start: 2021-06-10 | End: 2021-06-10

## 2021-06-09 RX ORDER — NITROGLYCERIN 0.4 MG/1
0.4 TABLET SUBLINGUAL AS NEEDED
Status: DISCONTINUED | OUTPATIENT
Start: 2021-06-09 | End: 2021-06-10 | Stop reason: HOSPADM

## 2021-06-09 RX ORDER — ENOXAPARIN SODIUM 100 MG/ML
40 INJECTION SUBCUTANEOUS DAILY
Status: DISCONTINUED | OUTPATIENT
Start: 2021-06-10 | End: 2021-06-10 | Stop reason: HOSPADM

## 2021-06-09 RX ORDER — ATORVASTATIN CALCIUM 10 MG/1
20 TABLET, FILM COATED ORAL
Status: DISCONTINUED | OUTPATIENT
Start: 2021-06-09 | End: 2021-06-10

## 2021-06-09 RX ORDER — ACETAMINOPHEN 650 MG/1
650 SUPPOSITORY RECTAL
Status: DISCONTINUED | OUTPATIENT
Start: 2021-06-09 | End: 2021-06-10 | Stop reason: HOSPADM

## 2021-06-09 RX ADMIN — ATORVASTATIN CALCIUM 20 MG: 10 TABLET, FILM COATED ORAL at 21:05

## 2021-06-09 RX ADMIN — Medication 10 ML: at 21:06

## 2021-06-09 RX ADMIN — ASPIRIN 81 MG CHEWABLE TABLET 324 MG: 81 TABLET CHEWABLE at 11:54

## 2021-06-09 RX ADMIN — POLYETHYLENE GLYCOL 3350 17 G: 17 POWDER, FOR SOLUTION ORAL at 21:05

## 2021-06-09 NOTE — CONSULTS
[address], [address]   Office Phone [phone]      CARDIOLOGY CONSULTATION       Date of  Admission: 6/9/2021 10:46 AM     Admission type:Emergency   Primary Care Physician:None     Attending Provider: Sonia Sawyer MD  Cardiology Provider: Humble Bliss DNP, ANP-BC    CC/REASON FOR CONSULT: Chest pain:     Subjective:     Pedro Pina is a 48 y.o. female with no significant past medical history other than LVH on echocardiogram 2015 presents to the emergency room today with left anterior chest discomfort with radiation to the left shoulder. She reports the symptoms occurred after smoking crack cocaine today. The pressure and the intensity increased over short duration prompting her to seek attention in the emergency room. At present time she reports some mild chest pressure no longer having radiation to the arm but admits to some tingling in her hand. During her maximum period of chest pressure she denied associated nausea vomiting diaphoresis or dyspnea. Cardiac risk factors: Smokes 1 pack a day tobacco cigarettes, denies history of diabetes, hypertension or hyperlipidemia. She denies family history of known atherosclerotic heart disease. Substance abuse: Patient reports long history of crack cocaine abuse starting in her teenage years. Using at regular intervals.       Patient Active Problem List    Diagnosis Date Noted    LVH (left ventricular hypertrophy) via echo 02/10/2015    Abnormal computed tomography angiography (CTA)--chest with small pulmonary nodules R&L 02/10/2015    Chest pain,atypical 02/08/2015    Polysubstance abuse--IV heroin/cocaine 02/08/2015      None  Past Medical History:   Diagnosis Date    Hepatitis C     Infectious disease     hepatitis C      Social History     Socioeconomic History    Marital status: SINGLE     Spouse name: Not on file    Number of children: Not on file    Years of education: Not on file    Highest education level: Not on file   Tobacco Use    Smoking status: Current Every Day Smoker     Packs/day: 1.50    Smokeless tobacco: Never Used   Substance and Sexual Activity    Alcohol use: No    Drug use: Yes     Types: Heroin, Cocaine     Comment: Last used 8/12     Social Determinants of Health     Financial Resource Strain:     Difficulty of Paying Living Expenses:    Food Insecurity:     Worried About Running Out of Food in the Last Year:     Ran Out of Food in the Last Year:    Transportation Needs:     Lack of Transportation (Medical):  Lack of Transportation (Non-Medical):    Physical Activity:     Days of Exercise per Week:     Minutes of Exercise per Session:    Stress:     Feeling of Stress :    Social Connections:     Frequency of Communication with Friends and Family:     Frequency of Social Gatherings with Friends and Family:     Attends [employer] Services:     Active Member of Clubs or Organizations:     Attends Club or Organization Meetings:     Marital Status:      Allergies   Allergen Reactions    Lortab [Hydrocodone-Acetaminophen] Rash     Pt states she had Lortab recently and did not have a reaction. Pt denies. 05/17/19- Pt denies allergy to this.  Tramadol Other (comments)     05/17/19 - Pt denies allergy to this. History reviewed. No pertinent family history. No current facility-administered medications for this encounter. Current Outpatient Medications   Medication Sig    methadone (DOLOPHINE) 10 mg/mL solution Take 110 mg by mouth.  gabapentin (NEURONTIN) 100 mg capsule Take 1 Cap by mouth three (3) times daily.  (Patient not taking: Reported on 6/9/2021)        Review of Symptoms:   11 systems reviewed, negative other than as stated in the HPI        Objective:      Visit Vitals  /83 (BP 1 Location: Right upper arm, BP Patient Position: Lying left side)   Pulse 68 Temp 98.1 °F (36.7 °C)   Resp 20   Ht 5' 2\" (1.575 m)   Wt 186 lb 11.7 oz (84.7 kg)   SpO2 97%   BMI 34.15 kg/m²       Physical Exam     General: Well developed, in no acute distress, cooperative and alert  HEENT: No carotid bruits, no JVD, trach is midline. Neck Supple, PERRL, EOM intact. Heart:  Normal S1/S2 negative S3 or S4. Regular, no murmur, gallop or rub. Respiratory: Clear bilaterally x 4, no wheezing or rales  Abdomen:   Soft, non-tender, no masses, bowel sounds are active. Extremities:  No edema, normal cap refill, no cyanosis, atraumatic. Neuro: A&Ox3, speech clear, gait stable. Skin: Skin color is normal. No rashes or lesions. Non diaphoretic  Vascular: 2+ pulses symmetric in all extremities    Data Review:   Recent Labs     06/09/21  1119   WBC 11.9*   HGB 12.0   HCT 40.5        Recent Labs     06/09/21  1119      K 4.3      CO2 27   *   BUN 11   CREA 0.81   CA 8.2*   ALB 3.0*   TBILI 0.4   ALT 23       Recent Labs     06/09/21  1119   TROIQ <0.05       No intake or output data in the 24 hours ending 06/09/21 1318     Cardiographics    Telemetry: Normal sinus rhythm   ECG: Sinus rhythm with septal Q waves, anterior lateral T wave inversion along with inferior wall, repeat EKG shows new inferior wall T wave is now upright. Echocardiogram: Preliminary report ejection fraction 55% no significant valvular anomaly, final report is still pending  CXRAY: No acute process       Assessment:       Active Problems:    * No active hospital problems. *  Chest pain     Plan:     Patient is a 24-year-old female presenting to the emergency room today with left anterior chest pressure with radiation to the left arm after use of crack cocaine. Symptoms improving while in the emergency room. First troponin is negative. EKG compared to 2016 shows septal Q waves, anterior lateral T wave inversions.   Preliminary echocardiogram does not show hypokinesis the final report is still pending. Her EKG changes may reflect left ventricular hypertrophy which was noted on 2015 echocardiogram however recommend patient is admitted to [facility] to have [facility] Tank nuclear stress test performed tomorrow to rule out ischemia. If her second troponin is elevated would then recommend transfer to [facility]. Continue to trend troponin overnight. Enteric-coated aspirin 81 mg daily. Screening lipid panel on admission. Thank you for this consultation, the orders for her stress test have been placed and is anticipated for early tomorrow morning. Placing patient n.p.o. after midnight.       Corina Aburto DNP, ANP-BC    CC:None

## 2021-06-09 NOTE — ED NOTES
Emergency Department Nursing Plan of Care       The Nursing Plan of Care is developed from the Nursing assessment and Emergency Department Attending provider initial evaluation. The plan of care may be reviewed in the ED Provider note.     The Plan of Care was developed with the following considerations:   Patient / Family readiness to learn indicated by:verbalized understanding  Persons(s) to be included in education: patient  Barriers to Learning/Limitations:No    Signed     Cheryl Ernst RN    6/9/2021   11:10 AM

## 2021-06-09 NOTE — PROGRESS NOTES
[address]  Transfer of care report d/t pt/nurse reassignment given to this RN Ariadna Vitale, ANNAMARIE and Jalyn, Student RN) by Nerissa Arreola RN. Report included review of SBAR, vs, labs, meds, orders, ROS and POC. Per Nerissa Arreola RN; SBAR out report given by ER nurse to Nerissa Arreola RN at approx [phone], pt arrived to unit via stretcher from ER at approx [phone]. See notes posted by Nerissa Arreola RN for admit to unit notes. 1800  Admit assessment completed by this RN, dual skin assessment deferred to shift change and to be conducted with on-coming night shift nurse. VS repeated, standing weight collected and orders reviewed with pt. Pharmacist notified of pts participation in methadone program.  Dietary called d/t no dinner tray delivered, dietary staff to deliver a tray to pt room. Safety rounds complete. 1825  Dietary tray delivered and set up on bedside table. Pt asleep, aroused by this RN to inform her of food d/t pt's prior request to have food ASAP, pt verbalized she was aware of food tray but back to sleep immediately. Bed alarm set d/t pts drowsiness, safety measures reviewed and in place. 1850  Pt now awake, sitting in bed and eating. Pt informed of upcoming shift change routine. 1935 Shift change report given to [facility], RN by this RN. Report included review of SBAR, vs, labs, procedures, orders, ROS and POC. All questions answered, transfer of care complete.

## 2021-06-09 NOTE — PROGRESS NOTES
Spoke with Dr. Cecil Hansen regarding this patient in emergency room. 24-year-old female experiencing chest pain after smoking crack cocaine. EKG showing septal infarct with anterior/ lateral T wave inversions. No lab work back yet. Will await troponin, if troponin is elevated will then transfer out to [facility] for non-STEMI, echocardiogram during ER stay to evaluate for hypokinesis. Patient has been ordered aspirin in the ER.     Latoya Kate, AMANDA, ANP-BC  [facility]    ER EKG 6/9/21 20216 EKG MANDI García

## 2021-06-09 NOTE — PROGRESS NOTES
[facility] [facility]. All questions answered with no initial issues noted. Patient arrived to floor at approx. 1525. Patient alert and oriented. Vital signs stable. Patient denied pain at this time. Patient reported wanting to rest. Patient stable upon arrival to floor.

## 2021-06-09 NOTE — H&P
Hospitalist Admission Note    NAME: Prakash Gregorio   :  1975   MRN:  [id number]   Room Number: VE29/47  @ [facility]     Date/Time:  2021 2:29 PM    Patient PCP: None  ______________________________________________________________________  Given the patient's current clinical presentation, I have a high level of concern for decompensation if discharged from the emergency department. Complex decision making was performed, which includes reviewing the patient's available past medical records, laboratory results, and x-ray films. My assessment of this patient's clinical condition and my plan of care is as follows. Assessment / Plan: Active Problems:    Chest pain,atypical (2015)      #Chest pain  #Crack cocaine use  -Patient endorses left-sided chest pain after using crack cocaine  -Has been using crack cocaine since age of 10  -Serial EKGs has been done with T wave normality in V1 V2 and V3  -Troponin negative x2  -Chest x-ray reviewed apparently no acute process  -Stress test tomorrow per cardiology  -Echo pending  -Aspirin and statin  -Check lipid panel A1c  -Nitroglycerin sublingual as needed for chest pain  -Trend troponin  -Telemetry  -Check urine pregnancy test  -Check UDS  -Cardiology on board      #Tobacco abuse  -Pack a day for past 30 years  -Patient was counseled extensively on the need to abstain from tobacco, its addictive tendencies, its deleterious effects on the lungs, cardiovascular  as well as its financial & social sequelae    #Obesity  -BMI 34  - Counseled on Lifestyle modifications, AHA Diet ,weight loss strategies. Body mass index is 34.15 kg/m². Code Status: Full   Surrogate Decision Maker:    DVT Prophylaxis: Lovenox  GI Prophylaxis: not indicated        Subjective:   CHIEF COMPLAINT: Left-sided chest pain    HISTORY OF PRESENT ILLNESS:     Prakash Gregorio is a 48 y.o.    female with PMH of above-mentioned problems who presents to ED with c/o left-sided chest pain since this morning after she used crack cocaine. Patient endorsed that she was using cocaine when she has had any chest pain going to left arm and has a child with some shortness of breath. Patient states that she does not have risk and up in the past and denies any family history of heart diseases. Patient denied any past medical history or taking any medication at home. Patient states that she does cocaine by smoking it for past 30 years and also endorsed smoking tobacco around the same time    We were asked to admit for work up and evaluation of the above problems. Past Medical History:   Diagnosis Date    Hepatitis C     Infectious disease     hepatitis C        History reviewed. No pertinent surgical history. Social History     Tobacco Use    Smoking status: Current Every Day Smoker     Packs/day: 1.50    Smokeless tobacco: Never Used   Substance Use Topics    Alcohol use: No        History reviewed. No pertinent family history. Allergies   Allergen Reactions    Lortab [Hydrocodone-Acetaminophen] Rash     Pt states she had Lortab recently and did not have a reaction. Pt denies. 05/17/19- Pt denies allergy to this.  Tramadol Other (comments)     05/17/19 - Pt denies allergy to this. Prior to Admission medications    Medication Sig Start Date End Date Taking? Authorizing Provider   methadone (DOLOPHINE) 10 mg/mL solution Take 110 mg by mouth. Yes Other, MD Todd   gabapentin (NEURONTIN) 100 mg capsule Take 1 Cap by mouth three (3) times daily. Patient not taking: Reported on 6/9/2021 8/13/17   Nancy Ching MD       REVIEW OF SYSTEMS:     I am not able to complete the review of systems because:    The patient is intubated and sedated    The patient has altered mental status due to his acute medical problems    The patient has baseline aphasia from prior stroke(s)    The patient has baseline dementia and is not reliable historian    The patient is in acute medical distress and unable to provide information           Total of 12 systems reviewed as follows:       POSITIVE= underlined text  Negative = text not underlined  General:  fever, chills, sweats, generalized weakness, weight loss/gain,      loss of appetite   Eyes:    blurred vision, eye pain, loss of vision, double vision  ENT:    rhinorrhea, pharyngitis   Respiratory:   cough, sputum production, SOB, SMALLS, wheezing, pleuritic pain   Cardiology:   chest pain, palpitations, orthopnea, PND, edema, syncope   Gastrointestinal:  abdominal pain , N/V, diarrhea, dysphagia, constipation, bleeding   Genitourinary:  frequency, urgency, dysuria, hematuria, incontinence   Muskuloskeletal :  arthralgia, myalgia, back pain  Hematology:  easy bruising, nose or gum bleeding, lymphadenopathy   Dermatological: rash, ulceration, pruritis, color change / jaundice  Endocrine:   hot flashes or polydipsia   Neurological:  headache, dizziness, confusion, focal weakness, paresthesia,     Speech difficulties, memory loss, gait difficulty  Psychological: Feelings of anxiety, depression, agitation    Objective:   VITALS:    Visit Vitals  /83 (BP 1 Location: Right upper arm, BP Patient Position: Lying left side)   Pulse 68   Temp 98.1 °F (36.7 °C)   Resp 20   Ht 5' 2\" (1.575 m)   Wt 84.7 kg (186 lb 11.7 oz)   SpO2 97%   BMI 34.15 kg/m²       PHYSICAL EXAM:    General:    Alert, cooperative, no distress, appears stated age. HEENT: Atraumatic, anicteric sclerae, pink conjunctivae     No oral ulcers, mucosa moist, throat clear, dentition fair  Neck:  Supple, symmetrical,  thyroid: non tender  Lungs:   Clear to auscultation bilaterally. No Wheezing or Rhonchi. No rales. Chest wall:  No tenderness  No Accessory muscle use. Heart:   Regular  rhythm,  No  murmur   No edema  Abdomen:   Soft, non-tender. Not distended. Bowel sounds normal  Extremities: No cyanosis.   No clubbing,      Skin turgor normal, Capillary refill normal, Radial dial pulse 2+  Skin:     Not pale. Not Jaundiced  No rashes   Psych:  Good insight. Not depressed. Not anxious or agitated. Neurologic: EOMs intact. No facial asymmetry. No aphasia or slurred speech. Symmetrical strength, Sensation grossly intact. Alert and oriented X 4.     ______________________________________________________________________    Care Plan discussed with:  Patient/Family    Expected  Disposition:  Home w/Family  ________________________________________________________________________  TOTAL TIME:  28 Minutes    Critical Care Provided     Minutes non procedure based      Comments    x Reviewed previous records   >50% of visit spent in counseling and coordination of care x Discussion with patient and/or family and questions answered       ________________________________________________________________________  Signed: Rosibel Cardenas MD    Procedures: see electronic medical records for all procedures/Xrays and details which were not copied into this note but were reviewed prior to creation of Plan.     LAB DATA REVIEWED:    Recent Results (from the past 24 hour(s))   EKG, 12 LEAD, INITIAL    Collection Time: 06/09/21 11:03 AM   Result Value Ref Range    Ventricular Rate 71 BPM    Atrial Rate 71 BPM    P-R Interval 116 ms    QRS Duration 82 ms    Q-T Interval 474 ms    QTC Calculation (Bezet) 515 ms    Calculated P Axis 66 degrees    Calculated R Axis 69 degrees    Calculated T Axis 7 degrees    Diagnosis       Normal sinus rhythm  Possible Left atrial enlargement  Septal infarct (cited on or before 08-FEB-2015)  T wave abnormality, consider anterolateral ischemia  Prolonged QT  Abnormal ECG  When compared with ECG of 09-FEB-2015 05:24,  No significant change was found     CBC WITH AUTOMATED DIFF    Collection Time: 06/09/21 11:19 AM   Result Value Ref Range    WBC 11.9 (H) 3.6 - 11.0 K/uL    RBC 5.18 3.80 - 5.20 M/uL    HGB 12.0 11.5 - 16.0 g/dL    HCT 40.5 35.0 - 47.0 %    MCV 78.2 (L) 80.0 - 99.0 FL    MCH 23.2 (L) 26.0 - 34.0 PG    MCHC 29.6 (L) 30.0 - 36.5 g/dL    RDW 20.2 (H) 11.5 - 14.5 %    PLATELET 077 650 - 519 K/uL    NRBC 0.0 0  WBC    ABSOLUTE NRBC 0.00 0.00 - 0.01 K/uL    NEUTROPHILS 65 32 - 75 %    LYMPHOCYTES 25 12 - 49 %    MONOCYTES 7 5 - 13 %    EOSINOPHILS 2 0 - 7 %    BASOPHILS 1 0 - 1 %    IMMATURE GRANULOCYTES 0 0.0 - 0.5 %    ABS. NEUTROPHILS 7.8 1.8 - 8.0 K/UL    ABS. LYMPHOCYTES 3.0 0.8 - 3.5 K/UL    ABS. MONOCYTES 0.8 0.0 - 1.0 K/UL    ABS. EOSINOPHILS 0.2 0.0 - 0.4 K/UL    ABS. BASOPHILS 0.1 0.0 - 0.1 K/UL    ABS. IMM. GRANS. 0.0 0.00 - 0.04 K/UL    DF SMEAR SCANNED      RBC COMMENTS ANISOCYTOSIS  1+       METABOLIC PANEL, COMPREHENSIVE    Collection Time: 06/09/21 11:19 AM   Result Value Ref Range    Sodium 141 136 - 145 mmol/L    Potassium 4.3 3.5 - 5.1 mmol/L    Chloride 105 97 - 108 mmol/L    CO2 27 21 - 32 mmol/L    Anion gap 9 5 - 15 mmol/L    Glucose 114 (H) 65 - 100 mg/dL    BUN 11 6 - 20 MG/DL    Creatinine 0.81 0.55 - 1.02 MG/DL    BUN/Creatinine ratio 14 12 - 20      GFR est AA >60 >60 ml/min/1.73m2    GFR est non-AA >60 >60 ml/min/1.73m2    Calcium 8.2 (L) 8.5 - 10.1 MG/DL    Bilirubin, total 0.4 0.2 - 1.0 MG/DL    ALT (SGPT) 23 12 - 78 U/L    AST (SGOT) 34 15 - 37 U/L    Alk.  phosphatase 147 (H) 45 - 117 U/L    Protein, total 7.5 6.4 - 8.2 g/dL    Albumin 3.0 (L) 3.5 - 5.0 g/dL    Globulin 4.5 (H) 2.0 - 4.0 g/dL    A-G Ratio 0.7 (L) 1.1 - 2.2     TROPONIN I    Collection Time: 06/09/21 11:19 AM   Result Value Ref Range    Troponin-I, Qt. <0.05 <0.05 ng/mL   ECHO ADULT COMPLETE    Collection Time: 06/09/21 12:36 PM   Result Value Ref Range    IVSd 1.28 (A) 0.60 - 0.90 cm    LVIDd 4.68 3.90 - 5.30 cm    LVIDs 2.27 cm    LVOT d 1.98 cm    LVPWd 1.26 (A) 0.60 - 0.90 cm    LV Ejection Fraction MOD 4C 66 percent    LV ED Vol A4C 69.88 mL    LV ES Vol A4C 23.87 mL    LVOT Peak Gradient 5.79 mmHg    LVOT Peak Velocity 120.15 cm/s RVSP 24.57 mmHg    Left Atrium Major Axis 3.62 cm    LA Area 4C 24.81 cm2    LA Vol 4C 81.43 (A) 22.00 - 52.00 mL    Right Atrial Area 4C 12.03 cm2    Est. RA Pressure 5.00 mmHg    Aortic Valve Area by Planimetry 2.78 cm2    Mitral Valve Area by Planimetry 5.08 cm2    MV A Yahir 68.90 cm/s    Mitral Valve E Wave Deceleration Time 181.16 ms    MV E Yahir 43.49 cm/s    Mitral Valve Pressure Half-time 68.32 ms    MVA (PHT) 3.22 cm2    MV Peak Gradient 4.79 mmHg    MV Mean Gradient 1.14 mmHg    Mitral Valve Max Velocity 109.42 cm/s    Mitral Valve Annulus Velocity Time Integral 24.53 cm    Pulmonic Valve Systolic Peak Instantaneous Gradient 3.27 mmHg    Pulmonic Regurgitant End Max Velocity 90.42 cm/s    TR Max Velocity 219.17 cm/s    Triscuspid Valve Regurgitation Peak Gradient 19.57 mmHg    Ao Root D 2.40 cm    MV E/A 0.63     LV Mass .5 67.0 - 162.0 g    LV Mass AL Index 122.8 43.0 - 95.0 g/m2    Left Atrium Minor Axis 1.95 cm    LA Vol Index 43.78 16.00 - 28.00 ml/m2    LVED Vol Index A4C 37.6 mL/m2    LVES Vol Index A4C 12.8 mL/m2   EKG, 12 LEAD, SUBSEQUENT    Collection Time: 06/09/21  1:12 PM   Result Value Ref Range    Ventricular Rate 65 BPM    Atrial Rate 65 BPM    P-R Interval 116 ms    QRS Duration 84 ms    Q-T Interval 502 ms    QTC Calculation (Bezet) 522 ms    Calculated P Axis 70 degrees    Calculated R Axis 80 degrees    Calculated T Axis 67 degrees    Diagnosis       Normal sinus rhythm  Septal infarct , age undetermined  T wave abnormality, consider anterior ischemia  Prolonged QT  Abnormal ECG  When compared with ECG of 09-JUN-2021 11:03,  MANUAL COMPARISON REQUIRED, DATA IS UNCONFIRMED     TROPONIN I    Collection Time: 06/09/21  1:29 PM   Result Value Ref Range    Troponin-I, Qt. <0.05 <0.05 ng/mL

## 2021-06-09 NOTE — ED TRIAGE NOTES
Patient comes to the ED for treatment of left chest to arm sharp pain this am.  Reported she \"smoked crack\" before onset of pain which continues but \"  not as bad\".   Also c/o constipation

## 2021-06-09 NOTE — ED PROVIDER NOTES
EMERGENCY DEPARTMENT HISTORY AND PHYSICAL EXAM      Date: 6/9/2021  Patient Name: Malina Leonard    History of Presenting Illness     Chief Complaint   Patient presents with    Chest Pain    Constipation     History Provided By: Patient    HPI: Malina Leonard, 48 y.o. female with past medical history significant for hepatitis C who presents via private vehicle to the ED with cc of left-sided chest pain that radiates to the left shoulder and down the left arm for the past hour. Patient states his symptoms started after smoking crack cocaine today. She describes the pain as a dull/aching pain. She denies taking any medications for the symptoms. She denies any shortness of breath, lightheadedness, diaphoresis, nausea, or vomiting. She has never experienced this kind of pain before when smoking crack. She denies any cardiac history, diabetes, or hypertension. In addition to the chest pain, she also complains of constipation. Her last bowel movement was 2 days ago and she had to use an enema at that time. She denies taking any stool softeners or laxatives. PMHx: Hepatitis C  Social Hx: Smokes 1/2 packs/day, denies alcohol use, occasional crack cocaine use and on methadone for prior heroin abuse    PCP: None    There are no other complaints, changes, or physical findings at this time. No current facility-administered medications on file prior to encounter. Current Outpatient Medications on File Prior to Encounter   Medication Sig Dispense Refill    methadone (DOLOPHINE) 10 mg/mL solution Take 110 mg by mouth.  gabapentin (NEURONTIN) 100 mg capsule Take 1 Cap by mouth three (3) times daily. (Patient not taking: Reported on 6/9/2021) 30 Cap 0     Past History     Past Medical History:  Past Medical History:   Diagnosis Date    Hepatitis C     Infectious disease     hepatitis C     Past Surgical History:  History reviewed. No pertinent surgical history. Family History:  History reviewed. No pertinent family history. Social History:  Social History     Tobacco Use    Smoking status: Current Every Day Smoker     Packs/day: 1.50    Smokeless tobacco: Never Used   Substance Use Topics    Alcohol use: No    Drug use: Yes     Types: Heroin, Cocaine     Comment: Last used 8/12     Allergies: Allergies   Allergen Reactions    Lortab [Hydrocodone-Acetaminophen] Rash     Pt states she had Lortab recently and did not have a reaction. Pt denies. 05/17/19- Pt denies allergy to this.  Tramadol Other (comments)     05/17/19 - Pt denies allergy to this. Review of Systems   Review of Systems   Constitutional: Negative for chills and fever. HENT: Negative for congestion, rhinorrhea, sneezing and sore throat. Eyes: Negative for redness and visual disturbance. Respiratory: Negative for shortness of breath. Cardiovascular: Positive for chest pain. Negative for leg swelling. Gastrointestinal: Negative for abdominal pain, nausea and vomiting. Genitourinary: Negative for difficulty urinating and frequency. Musculoskeletal: Negative for back pain, myalgias and neck stiffness. Skin: Negative for rash. Neurological: Negative for dizziness, syncope, weakness and headaches. Hematological: Negative for adenopathy. All other systems reviewed and are negative. Physical Exam   Physical Exam  Vitals and nursing note reviewed. Constitutional:       Appearance: Normal appearance. She is well-developed. HENT:      Head: Normocephalic and atraumatic. Eyes:      Conjunctiva/sclera: Conjunctivae normal.   Cardiovascular:      Rate and Rhythm: Normal rate and regular rhythm. Pulses: Normal pulses. Heart sounds: Normal heart sounds, S1 normal and S2 normal. No murmur heard. Pulmonary:      Effort: Pulmonary effort is normal. No respiratory distress. Breath sounds: Normal breath sounds. No wheezing. Chest:      Chest wall: No tenderness.    Abdominal:      General: Bowel sounds are normal. There is no distension. Palpations: Abdomen is soft. Tenderness: There is no abdominal tenderness. There is no rebound. Musculoskeletal:         General: Normal range of motion. Cervical back: Full passive range of motion without pain, normal range of motion and neck supple. Skin:     General: Skin is warm and dry. Findings: No rash. Neurological:      Mental Status: She is alert and oriented to person, place, and time. Psychiatric:         Speech: Speech normal.         Behavior: Behavior normal.         Thought Content: Thought content normal.         Judgment: Judgment normal.       Diagnostic Study Results   Labs -     Recent Results (from the past 12 hour(s))   EKG, 12 LEAD, INITIAL    Collection Time: 06/09/21 11:03 AM   Result Value Ref Range    Ventricular Rate 71 BPM    Atrial Rate 71 BPM    P-R Interval 116 ms    QRS Duration 82 ms    Q-T Interval 474 ms    QTC Calculation (Bezet) 515 ms    Calculated P Axis 66 degrees    Calculated R Axis 69 degrees    Calculated T Axis 7 degrees    Diagnosis       Normal sinus rhythm  Possible Left atrial enlargement  Septal infarct (cited on or before 08-FEB-2015)  T wave abnormality, consider anterolateral ischemia  Prolonged QT  Abnormal ECG  When compared with ECG of 09-FEB-2015 05:24,  No significant change was found     CBC WITH AUTOMATED DIFF    Collection Time: 06/09/21 11:19 AM   Result Value Ref Range    WBC 11.9 (H) 3.6 - 11.0 K/uL    RBC 5.18 3.80 - 5.20 M/uL    HGB 12.0 11.5 - 16.0 g/dL    HCT 40.5 35.0 - 47.0 %    MCV 78.2 (L) 80.0 - 99.0 FL    MCH 23.2 (L) 26.0 - 34.0 PG    MCHC 29.6 (L) 30.0 - 36.5 g/dL    RDW 20.2 (H) 11.5 - 14.5 %    PLATELET 830 275 - 980 K/uL    NRBC 0.0 0  WBC    ABSOLUTE NRBC 0.00 0.00 - 0.01 K/uL    NEUTROPHILS 65 32 - 75 %    LYMPHOCYTES 25 12 - 49 %    MONOCYTES 7 5 - 13 %    EOSINOPHILS 2 0 - 7 %    BASOPHILS 1 0 - 1 %    IMMATURE GRANULOCYTES 0 0.0 - 0.5 %    ABS. NEUTROPHILS 7.8 1.8 - 8.0 K/UL    ABS. LYMPHOCYTES 3.0 0.8 - 3.5 K/UL    ABS. MONOCYTES 0.8 0.0 - 1.0 K/UL    ABS. EOSINOPHILS 0.2 0.0 - 0.4 K/UL    ABS. BASOPHILS 0.1 0.0 - 0.1 K/UL    ABS. IMM. GRANS. 0.0 0.00 - 0.04 K/UL    DF SMEAR SCANNED      RBC COMMENTS ANISOCYTOSIS  1+       METABOLIC PANEL, COMPREHENSIVE    Collection Time: 06/09/21 11:19 AM   Result Value Ref Range    Sodium 141 136 - 145 mmol/L    Potassium 4.3 3.5 - 5.1 mmol/L    Chloride 105 97 - 108 mmol/L    CO2 27 21 - 32 mmol/L    Anion gap 9 5 - 15 mmol/L    Glucose 114 (H) 65 - 100 mg/dL    BUN 11 6 - 20 MG/DL    Creatinine 0.81 0.55 - 1.02 MG/DL    BUN/Creatinine ratio 14 12 - 20      GFR est AA >60 >60 ml/min/1.73m2    GFR est non-AA >60 >60 ml/min/1.73m2    Calcium 8.2 (L) 8.5 - 10.1 MG/DL    Bilirubin, total 0.4 0.2 - 1.0 MG/DL    ALT (SGPT) 23 12 - 78 U/L    AST (SGOT) 34 15 - 37 U/L    Alk.  phosphatase 147 (H) 45 - 117 U/L    Protein, total 7.5 6.4 - 8.2 g/dL    Albumin 3.0 (L) 3.5 - 5.0 g/dL    Globulin 4.5 (H) 2.0 - 4.0 g/dL    A-G Ratio 0.7 (L) 1.1 - 2.2     TROPONIN I    Collection Time: 06/09/21 11:19 AM   Result Value Ref Range    Troponin-I, Qt. <0.05 <0.05 ng/mL   ECHO ADULT COMPLETE    Collection Time: 06/09/21 12:36 PM   Result Value Ref Range    IVSd 1.28 (A) 0.60 - 0.90 cm    LVIDd 4.68 3.90 - 5.30 cm    LVIDs 2.27 cm    LVOT d 1.98 cm    LVPWd 1.26 (A) 0.60 - 0.90 cm    LV Ejection Fraction MOD 4C 66 percent    LV ED Vol A4C 69.88 mL    LV ES Vol A4C 23.87 mL    LVOT Peak Gradient 5.79 mmHg    LVOT Peak Velocity 120.15 cm/s    RVSP 24.57 mmHg    Left Atrium Major Axis 3.62 cm    LA Area 4C 24.81 cm2    LA Vol 4C 81.43 (A) 22.00 - 52.00 mL    Right Atrial Area 4C 12.03 cm2    Est. RA Pressure 5.00 mmHg    Aortic Valve Area by Planimetry 2.78 cm2    Mitral Valve Area by Planimetry 5.08 cm2    MV A Yahir 68.90 cm/s    Mitral Valve E Wave Deceleration Time 181.16 ms    MV E Yahir 43.49 cm/s    Mitral Valve Pressure Half-time 68.32 ms MVA (PHT) 3.22 cm2    MV Peak Gradient 4.79 mmHg    MV Mean Gradient 1.14 mmHg    Mitral Valve Max Velocity 109.42 cm/s    Mitral Valve Annulus Velocity Time Integral 24.53 cm    Pulmonic Valve Systolic Peak Instantaneous Gradient 3.27 mmHg    Pulmonic Regurgitant End Max Velocity 90.42 cm/s    TR Max Velocity 219.17 cm/s    Triscuspid Valve Regurgitation Peak Gradient 19.57 mmHg    Ao Root D 2.40 cm    MV E/A 0.63     LV Mass .5 67.0 - 162.0 g    LV Mass AL Index 122.8 43.0 - 95.0 g/m2    Left Atrium Minor Axis 1.95 cm    LA Vol Index 43.78 16.00 - 28.00 ml/m2    LVED Vol Index A4C 37.6 mL/m2    LVES Vol Index A4C 12.8 mL/m2   EKG, 12 LEAD, SUBSEQUENT    Collection Time: 06/09/21  1:12 PM   Result Value Ref Range    Ventricular Rate 65 BPM    Atrial Rate 65 BPM    P-R Interval 116 ms    QRS Duration 84 ms    Q-T Interval 502 ms    QTC Calculation (Bezet) 522 ms    Calculated P Axis 70 degrees    Calculated R Axis 80 degrees    Calculated T Axis 67 degrees    Diagnosis       Normal sinus rhythm  Septal infarct , age undetermined  T wave abnormality, consider anterior ischemia  Prolonged QT  Abnormal ECG  When compared with ECG of 09-JUN-2021 11:03,  MANUAL COMPARISON REQUIRED, DATA IS UNCONFIRMED     TROPONIN I    Collection Time: 06/09/21  1:29 PM   Result Value Ref Range    Troponin-I, Qt. <0.05 <0.05 ng/mL       Radiologic Studies -   XR CHEST PORT   Final Result   No acute findings. XR CHEST PORT    Result Date: 6/9/2021  No acute findings. Medical Decision Making   I am the first provider for this patient. I reviewed the vital signs, available nursing notes, past medical history, past surgical history, family history and social history. Vital Signs-Reviewed the patient's vital signs.   Patient Vitals for the past 24 hrs:   Temp Pulse Resp BP SpO2   06/09/21 1213 -- 68 20 113/83 --   06/09/21 1156 -- 77 19 110/88 97 %   06/09/21 1052 98.1 °F (36.7 °C) 78 20 115/79 97 %     Pulse Oximetry Analysis - 97% on RA (normal)    Cardiac Monitor:   Rate: 78 bpm  Rhythm: Normal Sinus Rhythm     ED EKG interpretation: 11:03  Rhythm: normal sinus rhythm; and regular . Rate (approx.): 71; Axis: normal; P wave: normal; QRS interval: normal ; ST/T wave: T wave inverted; Other findings: abnormal ekg. This EKG was interpreted by Adri Chavira MD,ED Provider. Records Reviewed: Nursing Notes, Old Medical Records and Previous electrocardiograms    Provider Notes (Medical Decision Making):   78-year-old female presents with left-sided chest pain that radiates down the left arm that started approximately 1 hour ago. Patient reports the symptoms started after smoking crack cocaine. She does have been abnormal EKG but no sign of infarct. Will discuss with cardiology, treat with aspirin and obtain labs including cardiac markers. ED Course:   Initial assessment performed. The patients presenting problems have been discussed, and they are in agreement with the care plan formulated and outlined with them. I have encouraged them to ask questions as they arise throughout their visit. 11:10 AM  Campos Jane MD spoke with Alisha Bautista, Consult for Cardiology. Discussed HPI and PE, available diagnostic tests and clinical findings. He is in agreement with care plans as outlined. He agrees with the current plan of care. If the first troponin is normal, he recommends repeating it and the EKG after 2 hours. He will order an echo as well while she is getting her work-up. If her troponin is normal, he would like to admit her for a stress test here. If her troponin is elevated, he is recommending transfer to [facility] for cardiac catheterization. Tobacco Cessation Counseling   I spent 5 minutes discussing the medical risks of prolonged smoking habits and advised the patient of the benefits of the cessation of smoking, providing specific suggestions on how to quit.   Campos Jane MD    ALCOHOL/SUBSTANCE ABUSE COUNSELING:  Upon evaluation, pt endorsed recent alcohol/illicit drug use. For approximately 5 minutes, pt has been counseled on the dangers of alcohol and illicit drug use on their health, and they were encouraged to quit as soon as possible in order to decrease further risks to their health. Pt has conveyed their understanding of the risks involved should they continue to use these products. Progress Note  12:26 PM  I have re-evaluated pt and she states that her pain has nearly resolved. Will repeat her cardiac enzymes and EKG at the 2-hour antonio and reassess. ED EKG interpretation: 13:12  Rhythm: normal sinus rhythm; and regular . Rate (approx.): 65; Axis: normal; P wave: normal; QRS interval: prolonged; ST/T wave: T wave inverted; Other findings: abnormal ekg. This EKG was interpreted by Munira Carbone MD,ED Provider. Progress Note  2:13 PM  I have re-evaluated pt and her repeat troponin is negative. Will admit for stress test per cardiology recommendations. 2:13 PM  Apolinar Burger MD spoke with Dr. Tonya Sage, Consult for Hospitalist. Discussed HPI and PE, available diagnostic tests and clinical findings. He is in agreement with care plans as outlined. He has already spoken with cardiology and agrees to admit the patient. Progress Note:   Updated pt on all returned results and findings. Discussed the importance of proper follow up as referred below along with return precautions. Pt in agreement with the care plan and expresses agreement with and understanding of all items discussed. Disposition:  ADMIT NOTE:  2:13 PM  The patient is being admitted to the hospital by Dr. Tonya Sage. The results of their tests and reasons for their admission have been discussed with the patient and/or available family. They convey agreement and understanding for the need to be admitted and for their admission diagnosis. PLAN:  1. Admit    Diagnosis     Clinical Impression:   1.  Chest pain due to myocardial ischemia, unspecified ischemic chest pain type    2. Cocaine abuse ([facility] [address]    3. Tobacco abuse    4. Constipation, unspecified constipation type            Please note that this dictation was completed with Dragon, computer voice recognition software. Quite often unanticipated grammatical, syntax, homophones, and other interpretive errors are inadvertently transcribed by the computer software. Please disregard these errors. Additionally, please excuse any errors that have escaped final proofreading.

## 2021-06-10 ENCOUNTER — APPOINTMENT (OUTPATIENT)
Dept: NUCLEAR MEDICINE | Age: 46
End: 2021-06-10
Attending: EMERGENCY MEDICINE
Payer: MEDICAID

## 2021-06-10 VITALS
OXYGEN SATURATION: 100 % | RESPIRATION RATE: 18 BRPM | TEMPERATURE: 98.2 F | HEART RATE: 72 BPM | DIASTOLIC BLOOD PRESSURE: 79 MMHG | HEIGHT: 62 IN | BODY MASS INDEX: 34.04 KG/M2 | WEIGHT: 185 LBS | SYSTOLIC BLOOD PRESSURE: 122 MMHG

## 2021-06-10 LAB
ANION GAP SERPL CALC-SCNC: 5 MMOL/L (ref 5–15)
ATRIAL RATE: 65 BPM
ATRIAL RATE: 71 BPM
BASOPHILS # BLD: 0.1 K/UL (ref 0–0.1)
BASOPHILS NFR BLD: 1 % (ref 0–1)
BUN SERPL-MCNC: 14 MG/DL (ref 6–20)
BUN/CREAT SERPL: 16 (ref 12–20)
CALCIUM SERPL-MCNC: 8.1 MG/DL (ref 8.5–10.1)
CALCULATED P AXIS, ECG09: 66 DEGREES
CALCULATED P AXIS, ECG09: 70 DEGREES
CALCULATED R AXIS, ECG10: 69 DEGREES
CALCULATED R AXIS, ECG10: 80 DEGREES
CALCULATED T AXIS, ECG11: 67 DEGREES
CALCULATED T AXIS, ECG11: 7 DEGREES
CHLORIDE SERPL-SCNC: 108 MMOL/L (ref 97–108)
CHOLEST SERPL-MCNC: 217 MG/DL
CO2 SERPL-SCNC: 31 MMOL/L (ref 21–32)
CREAT SERPL-MCNC: 0.87 MG/DL (ref 0.55–1.02)
DIAGNOSIS, 93000: NORMAL
DIAGNOSIS, 93000: NORMAL
DIFFERENTIAL METHOD BLD: ABNORMAL
EOSINOPHIL # BLD: 0.2 K/UL (ref 0–0.4)
EOSINOPHIL NFR BLD: 2 % (ref 0–7)
ERYTHROCYTE [DISTWIDTH] IN BLOOD BY AUTOMATED COUNT: 20.1 % (ref 11.5–14.5)
EST. AVERAGE GLUCOSE BLD GHB EST-MCNC: 120 MG/DL
GLUCOSE SERPL-MCNC: 95 MG/DL (ref 65–100)
HBA1C MFR BLD: 5.8 % (ref 4–5.6)
HCT VFR BLD AUTO: 41 % (ref 35–47)
HDLC SERPL-MCNC: 84 MG/DL
HDLC SERPL: 2.6 {RATIO} (ref 0–5)
HGB BLD-MCNC: 12 G/DL (ref 11.5–16)
IMM GRANULOCYTES # BLD AUTO: 0 K/UL (ref 0–0.04)
IMM GRANULOCYTES NFR BLD AUTO: 0 % (ref 0–0.5)
LDLC SERPL CALC-MCNC: 118.6 MG/DL (ref 0–100)
LYMPHOCYTES # BLD: 2.8 K/UL (ref 0.8–3.5)
LYMPHOCYTES NFR BLD: 35 % (ref 12–49)
MAGNESIUM SERPL-MCNC: 1.7 MG/DL (ref 1.6–2.4)
MCH RBC QN AUTO: 23.1 PG (ref 26–34)
MCHC RBC AUTO-ENTMCNC: 29.3 G/DL (ref 30–36.5)
MCV RBC AUTO: 78.8 FL (ref 80–99)
MONOCYTES # BLD: 0.6 K/UL (ref 0–1)
MONOCYTES NFR BLD: 7 % (ref 5–13)
NEUTS SEG # BLD: 4.3 K/UL (ref 1.8–8)
NEUTS SEG NFR BLD: 55 % (ref 32–75)
NRBC # BLD: 0 K/UL (ref 0–0.01)
NRBC BLD-RTO: 0 PER 100 WBC
P-R INTERVAL, ECG05: 116 MS
P-R INTERVAL, ECG05: 116 MS
PLATELET # BLD AUTO: 254 K/UL (ref 150–400)
PMV BLD AUTO: 12.1 FL (ref 8.9–12.9)
POTASSIUM SERPL-SCNC: 4.2 MMOL/L (ref 3.5–5.1)
Q-T INTERVAL, ECG07: 474 MS
Q-T INTERVAL, ECG07: 502 MS
QRS DURATION, ECG06: 82 MS
QRS DURATION, ECG06: 84 MS
QTC CALCULATION (BEZET), ECG08: 515 MS
QTC CALCULATION (BEZET), ECG08: 522 MS
RBC # BLD AUTO: 5.2 M/UL (ref 3.8–5.2)
RBC MORPH BLD: ABNORMAL
SODIUM SERPL-SCNC: 144 MMOL/L (ref 136–145)
STRESS BASELINE DIAS BP: 79 MMHG
STRESS BASELINE HR: 60 BPM
STRESS BASELINE SYS BP: 110 MMHG
STRESS ESTIMATED WORKLOAD: 1 METS
STRESS EXERCISE DUR MIN: NORMAL
STRESS PEAK DIAS BP: 92 MMHG
STRESS PEAK SYS BP: 144 MMHG
STRESS PERCENT HR ACHIEVED: 62 %
STRESS POST PEAK HR: 107 BPM
STRESS RATE PRESSURE PRODUCT: NORMAL BPM*MMHG
STRESS TARGET HR: 172 BPM
TRIGL SERPL-MCNC: 72 MG/DL (ref ?–150)
VENTRICULAR RATE, ECG03: 65 BPM
VENTRICULAR RATE, ECG03: 71 BPM
VLDLC SERPL CALC-MCNC: 14.4 MG/DL
WBC # BLD AUTO: 8 K/UL (ref 3.6–11)

## 2021-06-10 PROCEDURE — 74011250636 HC RX REV CODE- 250/636: Performed by: STUDENT IN AN ORGANIZED HEALTH CARE EDUCATION/TRAINING PROGRAM

## 2021-06-10 PROCEDURE — 96372 THER/PROPH/DIAG INJ SC/IM: CPT

## 2021-06-10 PROCEDURE — 36415 COLL VENOUS BLD VENIPUNCTURE: CPT

## 2021-06-10 PROCEDURE — 85025 COMPLETE CBC W/AUTO DIFF WBC: CPT

## 2021-06-10 PROCEDURE — 74011250636 HC RX REV CODE- 250/636

## 2021-06-10 PROCEDURE — 83735 ASSAY OF MAGNESIUM: CPT

## 2021-06-10 PROCEDURE — 99218 HC RM OBSERVATION: CPT

## 2021-06-10 PROCEDURE — 78452 HT MUSCLE IMAGE SPECT MULT: CPT

## 2021-06-10 PROCEDURE — 74011250637 HC RX REV CODE- 250/637: Performed by: STUDENT IN AN ORGANIZED HEALTH CARE EDUCATION/TRAINING PROGRAM

## 2021-06-10 PROCEDURE — 93005 ELECTROCARDIOGRAM TRACING: CPT

## 2021-06-10 PROCEDURE — 80048 BASIC METABOLIC PNL TOTAL CA: CPT

## 2021-06-10 PROCEDURE — 93017 CV STRESS TEST TRACING ONLY: CPT

## 2021-06-10 PROCEDURE — A9500 TC99M SESTAMIBI: HCPCS | Performed by: STUDENT IN AN ORGANIZED HEALTH CARE EDUCATION/TRAINING PROGRAM

## 2021-06-10 RX ORDER — TETRAKIS(2-METHOXYISOBUTYLISOCYANIDE)COPPER(I) TETRAFLUOROBORATE 1 MG/ML
30 INJECTION, POWDER, LYOPHILIZED, FOR SOLUTION INTRAVENOUS
Status: COMPLETED | OUTPATIENT
Start: 2021-06-10 | End: 2021-06-10

## 2021-06-10 RX ORDER — ATROPINE SULFATE 0.1 MG/ML
INJECTION INTRAVENOUS
Status: DISCONTINUED
Start: 2021-06-10 | End: 2021-06-10 | Stop reason: WASHOUT

## 2021-06-10 RX ORDER — NITROGLYCERIN 0.4 MG/1
TABLET SUBLINGUAL
Status: DISCONTINUED
Start: 2021-06-10 | End: 2021-06-10 | Stop reason: WASHOUT

## 2021-06-10 RX ORDER — LANOLIN ALCOHOL/MO/W.PET/CERES
400 CREAM (GRAM) TOPICAL
Status: COMPLETED | OUTPATIENT
Start: 2021-06-10 | End: 2021-06-10

## 2021-06-10 RX ORDER — POLYETHYLENE GLYCOL 3350 17 G/17G
17 POWDER, FOR SOLUTION ORAL DAILY
Qty: 1 PACKET | Refills: 1 | Status: SHIPPED | OUTPATIENT
Start: 2021-06-10 | End: 2021-11-09

## 2021-06-10 RX ORDER — METHADONE HYDROCHLORIDE 10 MG/1
110 TABLET ORAL DAILY
Status: DISCONTINUED | OUTPATIENT
Start: 2021-06-10 | End: 2021-06-10

## 2021-06-10 RX ORDER — AMINOPHYLLINE 25 MG/ML
INJECTION, SOLUTION INTRAVENOUS
Status: DISCONTINUED
Start: 2021-06-10 | End: 2021-06-10 | Stop reason: WASHOUT

## 2021-06-10 RX ORDER — TETRAKIS(2-METHOXYISOBUTYLISOCYANIDE)COPPER(I) TETRAFLUOROBORATE 1 MG/ML
10 INJECTION, POWDER, LYOPHILIZED, FOR SOLUTION INTRAVENOUS
Status: COMPLETED | OUTPATIENT
Start: 2021-06-10 | End: 2021-06-10

## 2021-06-10 RX ORDER — METHADONE HYDROCHLORIDE 10 MG/1
110 TABLET ORAL
Status: COMPLETED | OUTPATIENT
Start: 2021-06-10 | End: 2021-06-10

## 2021-06-10 RX ADMIN — ASPIRIN 81 MG CHEWABLE TABLET 81 MG: 81 TABLET CHEWABLE at 08:57

## 2021-06-10 RX ADMIN — REGADENOSON 0.4 MG: 0.08 INJECTION, SOLUTION INTRAVENOUS at 09:35

## 2021-06-10 RX ADMIN — TECHNETIUM TC-99M SESTAMIBI 30 MILLICURIE: 1 INJECTION INTRAVENOUS at 10:30

## 2021-06-10 RX ADMIN — POLYETHYLENE GLYCOL 3350 17 G: 17 POWDER, FOR SOLUTION ORAL at 16:45

## 2021-06-10 RX ADMIN — Medication 400 MG: at 10:27

## 2021-06-10 RX ADMIN — ENOXAPARIN SODIUM 40 MG: 40 INJECTION SUBCUTANEOUS at 08:58

## 2021-06-10 RX ADMIN — TECHNETIUM TC-99M SESTAMIBI 10 MILLICURIE: 1 INJECTION INTRAVENOUS at 08:00

## 2021-06-10 RX ADMIN — Medication 10 ML: at 14:56

## 2021-06-10 RX ADMIN — Medication 10 ML: at 06:00

## 2021-06-10 RX ADMIN — METHADONE HYDROCHLORIDE 110 MG: 10 TABLET ORAL at 10:26

## 2021-06-10 NOTE — PROGRESS NOTES
IDT Note  ORIANA KEANE   Planning-  Discharge scheduled for today. Needs PCP follow-up and resume methadone treatment.   STEPHANI Venegas/ZEINA  [phone]

## 2021-06-10 NOTE — PROGRESS NOTES
[facility] [address] [facility], [address]  Office Phone [phone]    CARDIOLOGY PROGRESS NOTE    6/10/2021 8:41 AM    Admit Date: 6/9/2021    Admit Diagnosis:   Chest pain [R07.9]    Subjective:     Hoa Sharpe     Visit Vitals  /78 (BP 1 Location: Right upper arm, BP Patient Position: Lying)   Pulse 64   Temp 97.8 °F (36.6 °C)   Resp 16   Ht 5' 2\" (1.575 m)   Wt 185 lb (83.9 kg)   SpO2 100%   BMI 33.84 kg/m²       Current Facility-Administered Medications   Medication Dose Route Frequency    nitroglycerin (NITROSTAT) 0.4 mg tablet        aminophylline 500 mg/20 mL injection        regadenoson (LEXISCAN) 0.4 mg/5 mL injection        atropine 0.1 mg/mL injection        [Held by provider] methadone (DOLOPHINE) tablet 110 mg  110 mg Oral DAILY    sodium chloride (NS) flush 5-40 mL  5-40 mL IntraVENous Q8H    sodium chloride (NS) flush 5-40 mL  5-40 mL IntraVENous PRN    acetaminophen (TYLENOL) tablet 650 mg  650 mg Oral Q6H PRN    Or    acetaminophen (TYLENOL) suppository 650 mg  650 mg Rectal Q6H PRN    polyethylene glycol (MIRALAX) packet 17 g  17 g Oral DAILY PRN    ondansetron (ZOFRAN ODT) tablet 4 mg  4 mg Oral Q8H PRN    Or    ondansetron (ZOFRAN) injection 4 mg  4 mg IntraVENous Q6H PRN    enoxaparin (LOVENOX) injection 40 mg  40 mg SubCUTAneous DAILY    aspirin chewable tablet 81 mg  81 mg Oral DAILY    nitroglycerin (NITROSTAT) tablet 0.4 mg  0.4 mg SubLINGual PRN    atorvastatin (LIPITOR) tablet 20 mg  20 mg Oral QHS         Objective:      Physical Exam    General: Well developed, in no acute distress, cooperative and alert  HEENT: No carotid bruits, no JVD, trach is midline. Neck Supple, PERRL, EOM intact. Heart:  Normal S1/S2 negative S3 or S4. Regular, no murmur, gallop or rub.    Respiratory: Clear bilaterally x 4, no wheezing or rales  Abdomen:   Soft, non-tender, no masses, bowel sounds are active. Extremities:  No edema, normal cap refill, no cyanosis, atraumatic. Neuro: A&Ox3, speech clear, gait stable. Skin: Skin color is normal. No rashes or lesions. Non diaphoretic  Vascular: 2+ pulses symmetric in all extremities      Data Review:   Recent Labs     06/10/21  0322 06/09/21  1119   WBC 8.0 11.9*   HGB 12.0 12.0   HCT 41.0 40.5    265     Recent Labs     06/10/21  0322 06/09/21  1119    141   K 4.2 4.3    105   CO2 31 27   GLU 95 114*   BUN 14 11   CREA 0.87 0.81   CA 8.1* 8.2*   ALB  --  3.0*   TBILI  --  0.4   ALT  --  23       Recent Labs     06/09/21  1942 06/09/21  1329 06/09/21  1119   TROIQ <0.05 <0.05 <0.05         Intake/Output Summary (Last 24 hours) at 6/10/2021 0841  Last data filed at 6/9/2021 1945  Gross per 24 hour   Intake --   Output 550 ml   Net -550 ml        Telemetry:   Normal sinus rhythm    ECHO:  Interpretation Summary    · LV: Estimated LVEF is 60 - 65%. Normal cavity size and systolic function (ejection fraction normal). Mild concentric hypertrophy. Mild (grade 1) left ventricular diastolic dysfunction. Echo Findings    Left Ventricle Normal cavity size and systolic function (ejection fraction normal). Mild concentric hypertrophy. The estimated EF is 60 - 65%. There is mild (grade 1) left ventricular diastolic dysfunction. Left Atrium Normal cavity size. Right Ventricle Normal cavity size and global systolic function. Right Atrium Normal cavity size. Aortic Valve Normal valve structure, no stenosis and no regurgitation. Mitral Valve Normal valve structure and no stenosis. Trace regurgitation. Tricuspid Valve Normal valve structure and no stenosis. Trace regurgitation. Pulmonic Valve Normal valve structure and no stenosis. Trace regurgitation. Aorta Normal aortic root. Pulmonary Artery Pulmonary hypertension not suggested by Doppler findings. Pericardium No evidence of pericardial effusion. Assessment:     Active Problems:    Chest pain,atypical (2/8/2015)        Plan:     57-year-old female admitted yesterday for left anterior chest discomfort after smoking crack cocaine. Troponins negative x3, no change in second EKG, admitted to [facility] for stress test this morning. Echocardiogram demonstrating normal systolic function with mild grade 1 diastolic dysfunction trace MR/TR/SC. Plan for stress test this morning if no ischemia can be discharge from cardiology standpoint. Again counseled patient on abstinence from cocaine use.     Stephen Bazan DNP-ANP-BC

## 2021-06-10 NOTE — PROGRESS NOTES
Bedside and Verbal shift change report given to [facility] (oncoming nurse) by Rebeca Cedillo (offgoing nurse). Report included the following information SBAR, Kardex, Intake/Output, MAR, Accordion, Recent Results, Med Rec Status and Cardiac Rhythm SR WITH ELEVATED QT. Pt resting eyes close at this time. call bell in reach. 0900 Pt assessed and scheduled medicine given. 12 lead EKG performed. pt off the floor for stress test.    1000 Pt back to floor. 1010 Interdisciplinary team rounds were held 6/10/2021 with the following team members:Care Management, Nursing, Pharmacy and Physician. Plan of care discussed. See clinical pathway and/or care plan for interventions and desired outcomes. 1030 pt received dose of Methadone 110 mg.    1130 pt off the floor for 2nd part of the stress test.    1200 pt back to floor. 1415 pt left the floor with family member without letting anyone know. pt back to floor,pt instructed that she can't leave the floor. pt verbally understand. 1600 Dr Gadiel Blake visited pt.pt ok to be discharged. 1645 pt medicated with Miralax for constipation. 1700 AVS discharge instruction reviewed with pt.prescription medicine send to pt preferred pharmacy. pt received written instruction regarding her medicine and follow-up appointment with verbal feedback. pt has all her belonging with her. pt left the building with her family member.

## 2021-06-10 NOTE — DISCHARGE INSTRUCTIONS
- Please avoid cocaine and tobacco   - Please observe diet modification for elevated cholesterol   - follow up PCP

## 2021-06-10 NOTE — PROGRESS NOTES
[facility] Admission Pharmacy Medication Reconciliation      Ameya Love at [address] [facility] ([phone]) confirmed the patient was taking Methadone solution 110 mg with last dose on 6/9/21 at 5:41 am.    Thank you,  Ann Sapp, Pharm. D.  [phone]      Information obtained from: Patient  RxQuery data available1:None    Comments/recommendations:    1)Medication reconciliation performed with patient via telephone.  Patient reports only PTA medication is methadone 110 mg daily from [address] [facility] [phone]. She reports last dose on 6/9/21 5:30 AM.    2) Medication changes (since last review): Added   None  Removed   Gabapentin   Adjusted   None    3) The [facility] Prescription Monitoring Program () was accessed to determine fill history of any controlled medications. No record found. 1RxQuery pharmacy benefit data reflects medications filled and processed through the patient's insurance, however                this data does NOT capture whether the medication was picked up or is currently being taken by the patient. Patient allergies: Allergies as of 06/09/2021 - Fully Reviewed 06/09/2021   Allergen Reaction Noted    Lortab [hydrocodone-acetaminophen] Rash 04/08/2014    Tramadol Other (comments) 10/29/2015       Prior to Admission Medications   Prescriptions Last Dose Informant Patient Reported? Taking? methadone (DOLOPHINE) 10 mg/mL solution 6/9/2021 at 5:41 am Other Yes Yes   Sig: Take 110 mg by mouth daily.       Facility-Administered Medications: None       Thank you,     Erika GrahamD   Contact: [phone]

## 2021-06-10 NOTE — DISCHARGE SUMMARY
Hospitalist Discharge Summary     Patient ID:  Katie Pickering  [id number]  47 y.o.  1975    PCP on record: None    Admit date: 6/9/2021  Discharge date and time: 6/10/2021      Admission Diagnoses: Chest pain [R07.9]    Discharge Diagnoses: Active Problems:    Chest pain,atypical (2/8/2015)           Hospital Course:     #Chest pain d/t cocaine use POA resolved   #Crack cocaine use  -Patient endorses left-sided chest pain after using crack cocaine  -Has been using crack cocaine since age of 10  -Serial EKGs has been done with T wave normality in V1 V2 and V3  -Troponin negative x3   -Chest x-ray reviewed apparently no acute process  -Stress test neg for ischemia or infarction   -Echo w/ EF 71-45 % Mild Diastolic dysfunction   - troponin trend negative   - urine pregnancy test neg   - UDS + cocaine, methadone and opiates   -Patient was counseled extensively on the need to abstain from drugs its addictive tendencies, its deleterious effects on the brain, cardiovascular system, lungs as well as its financial & social sequelae        #Tobacco abuse  -Pack a day for past 30 years  -Patient was counseled extensively on the need to abstain from tobacco, its addictive tendencies, its deleterious effects on the lungs, cardiovascular  as well as its financial & social sequelae     #Obesity  -BMI 34  - Counseled on Lifestyle modifications, AHA Diet ,weight loss strategies. # Methadone use  -  , repeat QTC  - dose confirmed 110 mg   - Free clinic notified about prolong QT - defer to clinic for management of methadone related Prolong QT     # HLD   - Cholesterol 217,    - ASCVD 1.2%   - No indication of statin   - Counseled on Lifestyle modifications, AHA Diet ,weight loss strategies. # Prediabetes   - A1c 5.8   - D/w patient about diet modifications         CONSULTATIONS:  IP CONSULT TO CARDIOLOGY    Excerpted HPI from H&P of Sarkis Warren MD:  Katie Pickering is a 48 y.o.  female with PMH of above-mentioned problems who presents to ED with c/o left-sided chest pain since this morning after she used crack cocaine. Patient endorsed that she was using cocaine when she has had any chest pain going to left arm and has a child with some shortness of breath. Patient states that she does not have risk and up in the past and denies any family history of heart diseases. Patient denied any past medical history or taking any medication at home. Patient states that she does cocaine by smoking it for past 30 years and also endorsed smoking tobacco around the same time    ______________________________________________________________________  DISCHARGE SUMMARY/HOSPITAL COURSE:  for full details see H&P, daily progress notes, labs, consult notes. _______________________________________________________________________  Patient seen and examined by me on discharge day. Pertinent Findings:  Gen:    Not in distress  Chest: Clear lungs  CVS:   Regular rhythm. No edema  Abd:  Soft, not distended, not tender  Neuro:  Alert with good insight. Oriented to person, place, and time   _______________________________________________________________________  DISCHARGE MEDICATIONS:   Current Discharge Medication List      START taking these medications    Details   polyethylene glycol (MIRALAX) 17 gram packet Take 1 Packet by mouth daily. Qty: 1 Packet, Refills: 1  Start date: 6/10/2021         CONTINUE these medications which have NOT CHANGED    Details   methadone (DOLOPHINE) 10 mg/mL solution Take 110 mg by mouth daily. My Recommended Diet, Activity, Wound Care, and follow-up labs are listed in the patient's Discharge Insturctions which I have personally completed and reviewed.     _______________________________________________________________________  DISPOSITION:     Home with Family:    Home with HH/PT/OT/RN:    SNF/LTC:    DIANNA:    OTHER:        Condition at Discharge:  Stable  _______________________________________________________________________  Follow up with:   PCP : None  Follow-up Information     Follow up With Specialties Details Why Ewa Barrientos [facility] Internal [facility] Go on 8/17/2021 Your appointment time is 10am.  , Bring ins card, picture id, and discharge papers, Please arrive 15 minutes early. , Please keep this appointment [address] [place] [address]  On 6/11/2021 Resume your daily treatment tomorrow. [address] [address] [address]  Phone: [phone]    Addiction Recovery Support Warm Line  Call on 6/10/2021 Talk to trained individuals with lived experience in addiction recovery. Safe and confidential.  They are here to help.  Call [phone]  8am to 12midnight  7days/week    None    None (395) Patient stated that they have no PCP                Total time in minutes spent coordinating this discharge (includes going over instructions, follow-up, prescriptions, and preparing report for sign off to her PCP) :  35 minutes    Signed:  Bradly Magaña MD

## 2021-06-10 NOTE — ACP (ADVANCE CARE PLANNING)
Current Advanced Directive/Advance Care Plan: Full Code Patient stated that she would want daughterChidi [phone] to be her [address].     STEPHANI Suazo/ZEINA  [phone]

## 2021-06-10 NOTE — PROGRESS NOTES
Reason for Admission:  HISTORY OF PRESENT ILLNESS:     Mel Bennett is a 48 y.o.  female with PMH of above-mentioned problems who presents to ED with c/o left-sided chest pain since this morning after she used crack cocaine. Patient here under OBSERVATION status. Patient stated that she has Arlyn Holder. Stated that she does not have card. In her wallet at home. Patient signed her OBS letter. Copy given to patient, noted on Documentation List and letter placed in patient's chart. RUR Score:                     Plan for utilizing home health:      No plans for [facility] at this time. PCP: First and Last name:  None     Name of Practice:    Are you a current patient: Yes/No:    Approximate date of last visit:    Can you participate in a virtual visit with your PCP:                     Current Advanced Directive/Advance Care Plan: Full Code Patient stated that she would want daughterMarty [phone] to be her [address]. Healthcare Decision Maker:   Click here to complete [address] including selection of the Healthcare Decision Maker Relationship (ie \"Primary\")                             Transition of Care Plan:        Met with patient for assessment. Lives with boyfriend in apartment. Uses [facility] on [address]. No HH or DME usage. No income except for [employer] [place]. Goes to daily methadone treatment at [address] [place] [address]. She goes daily for dosing. Patient stated that she has her own transportation home. IDT met to discuss plan of care. Patient is ready for discharge today. Patient states that she lives very close to the hospital and can walk home. CM offered her a ride and she declined. Planning  1/  CM called to establish Primary Care. Patient has an appointment with Liudmila Aaron at [facility].   Appointment is 8/17 at 10am.  Office to call CM and patient with earlier appointment. 2/  Contact Opioid treatment about what paperwork they they to resume services. CM called center and they are requesting d/c paperwork be faxed to them at [phone]. Information faxed. Care Management Interventions  PCP Verified by CM: Yes  Mode of Transport at Discharge: Self  Transition of Care Consult (CM Consult): Discharge Planning  Current Support Network: Lives Alone (Boyfriend lives with her)  Confirm Follow Up Transport: Self  The Plan for Transition of Care is Related to the Following Treatment Goals : Establish PCP, Substance Abuse Resources, Substance Abuse Treatment  The Patient and/or Patient Representative was Provided with a Choice of Provider and Agrees with the Discharge Plan?: Yes  Name of the Patient Representative Who was Provided with a Choice of Provider and Agrees with the Discharge Plan: Patient  Freedom of Choice List was Provided with Basic Dialogue that Supports the Patient's Individualized Plan of Care/Goals, Treatment Preferences and Shares the Quality Data Associated with the Providers?: Yes  Discharge Location  Discharge Placement: Home       Addiction Recovery Support Warm Line   Next steps: Call on 6/10/2021   Call [phone]   8am to 12midnight   7days/week   Talk to trained individuals with lived experience in addiction recovery.  Safe and confidential. Chaya Yen are here to help. [facility]   Next steps: Follow up on 6/11/2021   [address] [address]   Phone: [phone]   Resume your daily treatment tomorrow. Icon Checkbox    Go to [facility] on 8/17/2021   Specialty: Internal Medicine   [address]   [phone]   Your appointment time is 10am.  , Bring ins card, picture id, and discharge papers, Please arrive 15 minutes early. , Please keep this appointment.           Long Aldrich Liudmila Brooks, BSW/   [phone]

## 2021-06-10 NOTE — PROGRESS NOTES
1905:  shift change report given to Adrianna GustafsonRN (oncoming nurse) by Mustapha Wick RN (offgoing nurse). Report included the following information SBAR, Kardex, ED Summary, Procedure Summary, Intake/Output, MAR, Accordion, Recent Results and Med Rec Status. Patient in bed at this time, denies pain at this time. Educated that patient will be NPO after midnight, patient comprehends. 2000: VS obtained, head to toe assessment complete, patient is alert and oriented, able to make needs and wasn't known to staff. 2110: PM meds administered, patient tolerated well. 2230: Patient asleep at this time, no s/s of distress or discomfort noted. 0004: VS obtained, patient denies chest pain at this time. 0130: No s/s of distress or discomfort observed. 0345: Blood obtained for AM labwork, denies pain. 0515: Asleep in bed at this time. 0630: Patient resting in bed at this time, chest rises and falls without difficulty.

## 2021-06-14 LAB
ATRIAL RATE: 57 BPM
CALCULATED P AXIS, ECG09: 64 DEGREES
CALCULATED R AXIS, ECG10: 59 DEGREES
CALCULATED T AXIS, ECG11: 50 DEGREES
DIAGNOSIS, 93000: NORMAL
P-R INTERVAL, ECG05: 114 MS
Q-T INTERVAL, ECG07: 516 MS
QRS DURATION, ECG06: 82 MS
QTC CALCULATION (BEZET), ECG08: 502 MS
VENTRICULAR RATE, ECG03: 57 BPM

## 2021-07-28 ENCOUNTER — APPOINTMENT (OUTPATIENT)
Dept: GENERAL RADIOLOGY | Age: 46
End: 2021-07-28
Attending: PHYSICIAN ASSISTANT
Payer: MEDICAID

## 2021-07-28 ENCOUNTER — HOSPITAL ENCOUNTER (EMERGENCY)
Age: 46
Discharge: HOME | End: 2021-07-28
Attending: EMERGENCY MEDICINE
Payer: MEDICAID

## 2021-07-28 VITALS
TEMPERATURE: 98.5 F | RESPIRATION RATE: 18 BRPM | BODY MASS INDEX: 33.84 KG/M2 | OXYGEN SATURATION: 98 % | DIASTOLIC BLOOD PRESSURE: 86 MMHG | HEIGHT: 62 IN | HEART RATE: 78 BPM | SYSTOLIC BLOOD PRESSURE: 132 MMHG

## 2021-07-28 DIAGNOSIS — Z72.0 TOBACCO USE: ICD-10-CM

## 2021-07-28 DIAGNOSIS — R07.9 CHEST PAIN, UNSPECIFIED TYPE: Primary | ICD-10-CM

## 2021-07-28 LAB
ALBUMIN SERPL-MCNC: 2.8 G/DL (ref 3.5–5)
ALBUMIN/GLOB SERPL: 0.7 {RATIO} (ref 1.1–2.2)
ALP SERPL-CCNC: 170 U/L (ref 45–117)
ALT SERPL-CCNC: 26 U/L (ref 12–78)
ANION GAP SERPL CALC-SCNC: 5 MMOL/L (ref 5–15)
AST SERPL-CCNC: 22 U/L (ref 15–37)
BASOPHILS # BLD: 0.1 K/UL (ref 0–0.1)
BASOPHILS NFR BLD: 1 % (ref 0–1)
BILIRUB SERPL-MCNC: 0.1 MG/DL (ref 0.2–1)
BUN SERPL-MCNC: 10 MG/DL (ref 6–20)
BUN/CREAT SERPL: 12 (ref 12–20)
CALCIUM SERPL-MCNC: 8.1 MG/DL (ref 8.5–10.1)
CHLORIDE SERPL-SCNC: 107 MMOL/L (ref 97–108)
CK SERPL-CCNC: 76 U/L (ref 26–192)
CO2 SERPL-SCNC: 31 MMOL/L (ref 21–32)
CREAT SERPL-MCNC: 0.83 MG/DL (ref 0.55–1.02)
DIFFERENTIAL METHOD BLD: ABNORMAL
EOSINOPHIL # BLD: 0.2 K/UL (ref 0–0.4)
EOSINOPHIL NFR BLD: 2 % (ref 0–7)
ERYTHROCYTE [DISTWIDTH] IN BLOOD BY AUTOMATED COUNT: 19.2 % (ref 11.5–14.5)
GLOBULIN SER CALC-MCNC: 4 G/DL (ref 2–4)
GLUCOSE SERPL-MCNC: 99 MG/DL (ref 65–100)
HCT VFR BLD AUTO: 40.3 % (ref 35–47)
HGB BLD-MCNC: 11.6 G/DL (ref 11.5–16)
IMM GRANULOCYTES # BLD AUTO: 0 K/UL (ref 0–0.04)
IMM GRANULOCYTES NFR BLD AUTO: 0 % (ref 0–0.5)
LYMPHOCYTES # BLD: 2.7 K/UL (ref 0.8–3.5)
LYMPHOCYTES NFR BLD: 35 % (ref 12–49)
MCH RBC QN AUTO: 23.2 PG (ref 26–34)
MCHC RBC AUTO-ENTMCNC: 28.8 G/DL (ref 30–36.5)
MCV RBC AUTO: 80.8 FL (ref 80–99)
MONOCYTES # BLD: 0.5 K/UL (ref 0–1)
MONOCYTES NFR BLD: 7 % (ref 5–13)
NEUTS SEG # BLD: 4.3 K/UL (ref 1.8–8)
NEUTS SEG NFR BLD: 55 % (ref 32–75)
NRBC # BLD: 0 K/UL (ref 0–0.01)
NRBC BLD-RTO: 0 PER 100 WBC
PLATELET # BLD AUTO: 286 K/UL (ref 150–400)
PMV BLD AUTO: 11.7 FL (ref 8.9–12.9)
POTASSIUM SERPL-SCNC: 4.3 MMOL/L (ref 3.5–5.1)
PROT SERPL-MCNC: 6.8 G/DL (ref 6.4–8.2)
RBC # BLD AUTO: 4.99 M/UL (ref 3.8–5.2)
RBC MORPH BLD: ABNORMAL
SODIUM SERPL-SCNC: 143 MMOL/L (ref 136–145)
TROPONIN I SERPL-MCNC: <0.05 NG/ML
WBC # BLD AUTO: 7.8 K/UL (ref 3.6–11)

## 2021-07-28 PROCEDURE — 84484 ASSAY OF TROPONIN QUANT: CPT

## 2021-07-28 PROCEDURE — 85025 COMPLETE CBC W/AUTO DIFF WBC: CPT

## 2021-07-28 PROCEDURE — 99283 EMERGENCY DEPT VISIT LOW MDM: CPT

## 2021-07-28 PROCEDURE — 80053 COMPREHEN METABOLIC PANEL: CPT

## 2021-07-28 PROCEDURE — 82550 ASSAY OF CK (CPK): CPT

## 2021-07-28 PROCEDURE — 93005 ELECTROCARDIOGRAM TRACING: CPT

## 2021-07-28 PROCEDURE — 36415 COLL VENOUS BLD VENIPUNCTURE: CPT

## 2021-07-28 PROCEDURE — 74011250637 HC RX REV CODE- 250/637: Performed by: PHYSICIAN ASSISTANT

## 2021-07-28 PROCEDURE — 71046 X-RAY EXAM CHEST 2 VIEWS: CPT

## 2021-07-28 RX ORDER — GUAIFENESIN 100 MG/5ML
324 LIQUID (ML) ORAL
Status: COMPLETED | OUTPATIENT
Start: 2021-07-28 | End: 2021-07-28

## 2021-07-28 RX ADMIN — ASPIRIN 324 MG: 81 TABLET, CHEWABLE ORAL at 17:18

## 2021-07-28 NOTE — ED NOTES
Pt given printed discharge instructions and 0 script(s). Pt verbalized understanding of instructions and script(s). Pt verbalized importance of following up with PCP and cardiology. Pt alert and oriented, in no acute distress, ambulatory with self. Pt given printed copies of her labs and her EKG at her request so that she may take it to the methadone clinic in the morning.

## 2021-07-28 NOTE — ED NOTES
Pt reports intermittent sharp chest pain started this morning, lasts a few seconds each time and radiates to her left arm. Pt endorses cough, also says she has had this pain before and she was advised to quit smoking.

## 2021-07-28 NOTE — ED PROVIDER NOTES
EMERGENCY DEPARTMENT HISTORY AND PHYSICAL EXAM    Date: 7/28/2021  Patient Name: Christie Wylie    History of Presenting Illness     Chief Complaint   Patient presents with    Chest Pain         History Provided By: Patient    HPI: Christie Wylie is a 48 y.o. female with a PMH of hep C who presents with left-sided chest pain since \"early this morning. \"  Patient describes it as sharp pain that comes on intermittently for about 5 seconds at a time. Patient does report a cough and shortness of breath as well. Patient states she has had similar episodes in the past and states \"I almost had a heart attack that time and they told me to stop smoking. \"  Patient however reports that she continues to smoke. Patient does report that she had a stress test during the last episode. PCP: None    Current Outpatient Medications   Medication Sig Dispense Refill    methadone (DOLOPHINE) 10 mg/mL solution Take 100 mg by mouth daily.  polyethylene glycol (MIRALAX) 17 gram packet Take 1 Packet by mouth daily. 1 Packet 1       Past History     Past Medical History:  Past Medical History:   Diagnosis Date    Hepatitis C     Infectious disease     hepatitis C       Past Surgical History:  No past surgical history on file. Family History:  No family history on file. Social History:  Social History     Tobacco Use    Smoking status: Current Every Day Smoker     Packs/day: 1.50    Smokeless tobacco: Never Used   Substance Use Topics    Alcohol use: No    Drug use: Yes     Types: Heroin, Cocaine     Comment: Last used 8/12       Allergies: Allergies   Allergen Reactions    Lortab [Hydrocodone-Acetaminophen] Rash     Pt states she had Lortab recently and did not have a reaction. Pt denies. 05/17/19- Pt denies allergy to this.  Tramadol Other (comments)     05/17/19 - Pt denies allergy to this. Review of Systems   Review of Systems   Constitutional: Negative for chills and fever.    Respiratory: Positive for cough. Cardiovascular: Positive for chest pain. Gastrointestinal: Negative for nausea. Allergic/Immunologic: Negative for immunocompromised state. Neurological: Negative for speech difficulty and weakness. All other systems reviewed and are negative. Physical Exam     Vitals:    07/28/21 1622   BP: 132/86   Pulse: 78   Resp: 18   Temp: 98.5 °F (36.9 °C)   SpO2: 98%   Height: 5' 2\" (1.575 m)     Physical Exam  Vitals and nursing note reviewed. Constitutional:       General: She is not in acute distress. Appearance: She is well-developed. HENT:      Head: Normocephalic and atraumatic. Eyes:      Conjunctiva/sclera: Conjunctivae normal.   Cardiovascular:      Rate and Rhythm: Normal rate and regular rhythm. Heart sounds: Normal heart sounds. Pulmonary:      Effort: Pulmonary effort is normal. No respiratory distress. Breath sounds: Normal breath sounds. No wheezing or rales. Skin:     General: Skin is warm and dry. Neurological:      Mental Status: She is alert and oriented to person, place, and time. Psychiatric:         Behavior: Behavior normal.         Thought Content:  Thought content normal.         Judgment: Judgment normal.           Diagnostic Study Results     Labs -     Recent Results (from the past 12 hour(s))   EKG, 12 LEAD, INITIAL    Collection Time: 07/28/21  4:44 PM   Result Value Ref Range    Ventricular Rate 66 BPM    Atrial Rate 66 BPM    P-R Interval 116 ms    QRS Duration 86 ms    Q-T Interval 450 ms    QTC Calculation (Bezet) 471 ms    Calculated P Axis 48 degrees    Calculated R Axis 42 degrees    Calculated T Axis 20 degrees    Diagnosis       Normal sinus rhythm  Septal infarct (cited on or before 08-FEB-2015)  T wave abnormality, consider anterior ischemia  Abnormal ECG  When compared with ECG of 10-HEAVEN-2021 08:48,  Nonspecific T wave abnormality has replaced inverted T waves in Lateral leads     CBC WITH AUTOMATED DIFF    Collection Time: 07/28/21  5:37 PM   Result Value Ref Range    WBC 7.8 3.6 - 11.0 K/uL    RBC 4.99 3.80 - 5.20 M/uL    HGB 11.6 11.5 - 16.0 g/dL    HCT 40.3 35.0 - 47.0 %    MCV 80.8 80.0 - 99.0 FL    MCH 23.2 (L) 26.0 - 34.0 PG    MCHC 28.8 (L) 30.0 - 36.5 g/dL    RDW 19.2 (H) 11.5 - 14.5 %    PLATELET 569 978 - 597 K/uL    MPV 11.7 8.9 - 12.9 FL    NRBC 0.0 0  WBC    ABSOLUTE NRBC 0.00 0.00 - 0.01 K/uL    NEUTROPHILS 55 32 - 75 %    LYMPHOCYTES 35 12 - 49 %    MONOCYTES 7 5 - 13 %    EOSINOPHILS 2 0 - 7 %    BASOPHILS 1 0 - 1 %    IMMATURE GRANULOCYTES 0 0.0 - 0.5 %    ABS. NEUTROPHILS 4.3 1.8 - 8.0 K/UL    ABS. LYMPHOCYTES 2.7 0.8 - 3.5 K/UL    ABS. MONOCYTES 0.5 0.0 - 1.0 K/UL    ABS. EOSINOPHILS 0.2 0.0 - 0.4 K/UL    ABS. BASOPHILS 0.1 0.0 - 0.1 K/UL    ABS. IMM. GRANS. 0.0 0.00 - 0.04 K/UL    DF SMEAR SCANNED      RBC COMMENTS HYPOCHROMIA  1+       METABOLIC PANEL, COMPREHENSIVE    Collection Time: 07/28/21  5:37 PM   Result Value Ref Range    Sodium 143 136 - 145 mmol/L    Potassium 4.3 3.5 - 5.1 mmol/L    Chloride 107 97 - 108 mmol/L    CO2 31 21 - 32 mmol/L    Anion gap 5 5 - 15 mmol/L    Glucose 99 65 - 100 mg/dL    BUN 10 6 - 20 MG/DL    Creatinine 0.83 0.55 - 1.02 MG/DL    BUN/Creatinine ratio 12 12 - 20      GFR est AA >60 >60 ml/min/1.73m2    GFR est non-AA >60 >60 ml/min/1.73m2    Calcium 8.1 (L) 8.5 - 10.1 MG/DL    Bilirubin, total 0.1 (L) 0.2 - 1.0 MG/DL    ALT (SGPT) 26 12 - 78 U/L    AST (SGOT) 22 15 - 37 U/L    Alk. phosphatase 170 (H) 45 - 117 U/L    Protein, total 6.8 6.4 - 8.2 g/dL    Albumin 2.8 (L) 3.5 - 5.0 g/dL    Globulin 4.0 2.0 - 4.0 g/dL    A-G Ratio 0.7 (L) 1.1 - 2.2     TROPONIN I    Collection Time: 07/28/21  5:37 PM   Result Value Ref Range    Troponin-I, Qt. <0.05 <0.05 ng/mL   CK W/ REFLX CKMB    Collection Time: 07/28/21  5:37 PM   Result Value Ref Range    CK 76 26 - 192 U/L       Radiologic Studies -   XR CHEST PA LAT   Final Result    impression: No acute changes.         CT Results  (Last 48 hours)    None        CXR Results  (Last 48 hours)               07/28/21 1830  XR CHEST PA LAT Final result    Impression:   impression: No acute changes. Narrative:  Clinical indication: Chest pain. Frontal and lateral views of the chest obtained, comparison June 9, 2021. The    heart size is normal. There is no acute infiltrate. Medical Decision Making   I am the first provider for this patient. I reviewed the vital signs, available nursing notes, past medical history, past surgical history, family history and social history. Vital Signs-Reviewed the patient's vital signs. Records Reviewed: Nursing Notes and Old Medical Records - pt had similar work up last month with admission including ECHO and stress test    Provider Notes (Medical Decision Making):   Patient presents with CP. DDx:  ACS, Aortic dissection, PNA, PE, PTX, pericarditis, myocarditis, GERD, costochondritis, anxiety. Will obtain labs, CXR, EKG and get Cardiology Consult PRN. - I have re-examined the patient and the patient denies chest pain on re-examination. The patient has had onset of chest pain greater than 8 hours and one negative set of cardiac enzymes or 2 negative sets of cardiac enzymes in the ER during this visit. The diagnosis, follow up, return instructions, test results, x-rays and medications have been discussed and reviewed with the patient. The patient has been given the opportunity to ask questions. The patient  expresses understanding of the diagnosis, follow-up and return instructions. The patient agrees to follow up with primary care or cardiology as directed and to return immediately if the chest pain worsens.   The patient expresses understanding that although cardiac testing at this time is negative, a cardiac problem could still be present and that a follow-up appointment for further evaluation and risk factor modification is necessary to complete the evaluation of this complaint. Disposition:  Discharged    DISCHARGE NOTE:   6:48p      Care plan outlined and precautions discussed. Patient has no new complaints, changes, or physical findings. Results of labs and imaging were reviewed with the patient. All medications were reviewed with the patient; will d/c home. All of pt's questions and concerns were addressed. Patient was instructed and agrees to follow up with PCP prn, as well as to return to the ED upon further deterioration. Patient is ready to go home. Follow-up Information     Follow up With Specialties Details Why Contact Info    Jaciel Ann NP Cardiology, Nurse Practitioner Schedule an appointment as soon as possible for a visit   [address]  [id number] [place] [address] Mandy Teixeira  Schedule an appointment as soon as possible for a visit in 1 week  [id number] [place] [address] Rayo Howard  [phone]          Discharge Medication List as of 7/28/2021  6:48 PM          Procedures:  Procedures    Please note that this dictation was completed with Dragon, computer voice recognition software. Quite often unanticipated grammatical, syntax, homophones, and other interpretive errors are inadvertently transcribed by the computer software. Please disregard these errors. Additionally, please excuse any errors that have escaped final proofreading. Diagnosis     Clinical Impression:   1. Chest pain, unspecified type    2.  Tobacco use

## 2021-07-29 LAB
ATRIAL RATE: 66 BPM
CALCULATED P AXIS, ECG09: 48 DEGREES
CALCULATED R AXIS, ECG10: 42 DEGREES
CALCULATED T AXIS, ECG11: 20 DEGREES
DIAGNOSIS, 93000: NORMAL
P-R INTERVAL, ECG05: 116 MS
Q-T INTERVAL, ECG07: 450 MS
QRS DURATION, ECG06: 86 MS
QTC CALCULATION (BEZET), ECG08: 471 MS
VENTRICULAR RATE, ECG03: 66 BPM

## 2021-08-28 ENCOUNTER — HOSPITAL ENCOUNTER (EMERGENCY)
Age: 46
Discharge: HOME | End: 2021-08-28
Attending: EMERGENCY MEDICINE
Payer: MEDICAID

## 2021-08-28 ENCOUNTER — APPOINTMENT (OUTPATIENT)
Dept: GENERAL RADIOLOGY | Age: 46
End: 2021-08-28
Attending: EMERGENCY MEDICINE
Payer: MEDICAID

## 2021-08-28 VITALS
HEIGHT: 62 IN | OXYGEN SATURATION: 94 % | BODY MASS INDEX: 29.44 KG/M2 | DIASTOLIC BLOOD PRESSURE: 71 MMHG | WEIGHT: 160 LBS | HEART RATE: 90 BPM | RESPIRATION RATE: 16 BRPM | TEMPERATURE: 97.8 F | SYSTOLIC BLOOD PRESSURE: 118 MMHG

## 2021-08-28 DIAGNOSIS — S82.891A CLOSED FRACTURE OF RIGHT ANKLE, INITIAL ENCOUNTER: Primary | ICD-10-CM

## 2021-08-28 PROCEDURE — 73610 X-RAY EXAM OF ANKLE: CPT

## 2021-08-28 PROCEDURE — 73630 X-RAY EXAM OF FOOT: CPT

## 2021-08-28 PROCEDURE — 99282 EMERGENCY DEPT VISIT SF MDM: CPT

## 2021-08-28 RX ORDER — NAPROXEN 500 MG/1
500 TABLET ORAL 2 TIMES DAILY WITH MEALS
Qty: 20 TABLET | Refills: 0 | Status: SHIPPED | OUTPATIENT
Start: 2021-08-28 | End: 2021-09-07

## 2021-08-28 NOTE — ED NOTES
Patient here with c/o right ankle pain. Patient states that she twisted her ankle and fell about 1 week ago. Patient denies LOC with the fall. Patient states continued swelling and pain for the past week. Pulses and sensation intact. Emergency Department Nursing Plan of Care       The Nursing Plan of Care is developed from the Nursing assessment and Emergency Department Attending provider initial evaluation. The plan of care may be reviewed in the ED Provider note.     The Plan of Care was developed with the following considerations:   Patient / Family readiness to learn indicated by:verbalized understanding  Persons(s) to be included in education: patient  Barriers to Learning/Limitations:No    Signed     Bre Walker RN    8/28/2021   12:33 PM

## 2021-08-28 NOTE — ED PROVIDER NOTES
EMERGENCY DEPARTMENT HISTORY AND PHYSICAL EXAM      Date: 8/28/2021  Patient Name: Shawanda Orozco    History of Presenting Illness     Chief Complaint   Patient presents with    Ankle Injury     right ankle 1x week       History Provided By: Patient    HPI: Shawanda Orozco, 48 y.o. female presents to the ED with cc of right ankle pain. Patient states she was running for a bus over a week ago and twisted her ankle. She states she has had persistent pain and swelling since. She is able to weight-bear but with moderate pain. She denies any other distracting injuries including knee pain, hip pain and no head injury. Pain is rated at 5/10. She is on methadone. He denies any associated numbness, weakness or change in coloration of her foot. Is been no treatment prior to arrival.  She denies any prior surgery or injury to that extremity. There are no other complaints, changes, or physical findings at this time. PCP: None    No current facility-administered medications on file prior to encounter. Current Outpatient Medications on File Prior to Encounter   Medication Sig Dispense Refill    polyethylene glycol (MIRALAX) 17 gram packet Take 1 Packet by mouth daily. 1 Packet 1    methadone (DOLOPHINE) 10 mg/mL solution Take 100 mg by mouth daily. Past History     Past Medical History:  Past Medical History:   Diagnosis Date    Hepatitis C     Infectious disease     hepatitis C       Past Surgical History:  History reviewed. No pertinent surgical history. Family History:  History reviewed. No pertinent family history. Social History:  Social History     Tobacco Use    Smoking status: Current Every Day Smoker     Packs/day: 1.50    Smokeless tobacco: Never Used   Substance Use Topics    Alcohol use: No    Drug use: Yes     Types: Heroin, Cocaine     Comment: Last used 8/12       Allergies:   Allergies   Allergen Reactions    Lortab [Hydrocodone-Acetaminophen] Rash     Pt states she had Lortab recently and did not have a reaction. Pt denies. 05/17/19- Pt denies allergy to this.  Tramadol Other (comments)     05/17/19 - Pt denies allergy to this. Review of Systems   Review of Systems   Constitutional: Negative. Negative for chills and fever. HENT: Negative. Negative for congestion and rhinorrhea. Respiratory: Negative. Negative for cough, chest tightness and wheezing. Cardiovascular: Negative. Negative for chest pain and palpitations. Gastrointestinal: Negative. Negative for abdominal pain, constipation, nausea and vomiting. Endocrine: Negative. Genitourinary: Negative. Negative for decreased urine volume, flank pain, hematuria and pelvic pain. Musculoskeletal: Positive for arthralgias. Negative for back pain and neck pain. Skin: Negative. Negative for color change, pallor and rash. Neurological: Negative. Negative for dizziness, seizures, weakness, numbness and headaches. Hematological: Negative. Negative for adenopathy. Psychiatric/Behavioral: Negative. All other systems reviewed and are negative. Physical Exam   Physical Exam  Vitals and nursing note reviewed. Constitutional:       Appearance: She is not ill-appearing. HENT:      Head: Normocephalic and atraumatic. Cardiovascular:      Rate and Rhythm: Normal rate and regular rhythm. Pulses: Normal pulses. Heart sounds: Normal heart sounds. Pulmonary:      Effort: Pulmonary effort is normal.      Breath sounds: Normal breath sounds. Abdominal:      General: Abdomen is flat. Palpations: Abdomen is soft. Musculoskeletal:         General: Swelling, tenderness and signs of injury present. No deformity. Cervical back: Normal range of motion and neck supple. Right lower leg: Normal. No swelling, deformity, lacerations, tenderness or bony tenderness. No edema. Right ankle: Swelling present. Tenderness present over the lateral malleolus and ATF ligament. No proximal fibula tenderness. Right Achilles Tendon: Normal. Dockery's test negative. Left ankle: Normal.      Right foot: Swelling, prominent metatarsal heads and tenderness present. No deformity. Neurological:      Mental Status: She is alert. 1042-patient has a avulsion fracture of the right lateral malleolus. She was placed in a walking boot and referred to orthopedics. She is neurovascularly intact. Diagnostic Study Results     Labs -   No results found for this or any previous visit (from the past 12 hour(s)). Radiologic Studies -   XR FOOT RT MIN 3 V   Final Result   No acute abnormality. XR ANKLE RT MIN 3 V   Final Result   Avulsion fracture fragment adjacent to the tip of the lateral   malleolus. I discussed this impression with Dr. Josr Peoples at 8323 hours on 8/28/2021. CT Results  (Last 48 hours)    None        CXR Results  (Last 48 hours)    None          Medical Decision Making   I am the first provider for this patient. I reviewed the vital signs, available nursing notes, past medical history, past surgical history, family history and social history. Vital Signs-Reviewed the patient's vital signs. Patient Vitals for the past 12 hrs:   Temp Pulse Resp BP SpO2   08/28/21 0908 97.8 °F (36.6 °C) 90 16 118/71 94 %           Records Reviewed: Nursing Notes and Old Medical Records    Provider Notes (Medical Decision Making):   Differential diagnosis-ankle dislocation, ankle sprain, ankle fracture, foot fracture, compartment syndrome    Impression/plan-48year-old female to the ER with persistent ankle pain following a fall. X-ray reveals a distal evulsion fracture of the right lateral malleolus. She is neurovascularly intact. We will place in walking boot and follow-up to orthopedics. ED Course:   Initial assessment performed.  The patients presenting problems have been discussed, and they are in agreement with the care plan formulated and outlined with them. I have encouraged them to ask questions as they arise throughout their visit. Shahbaz Britton MD      Disposition:    DC- Adult Discharges: All of the diagnostic tests were reviewed and questions answered. Diagnosis, care plan and treatment options were discussed. The patient understands the instructions and will follow up as directed. The patients results have been reviewed with them. They have been counseled regarding their diagnosis. The patient and spouse/SO verbally convey understanding and agreement of the signs, symptoms, diagnosis, treatment and prognosis and additionally agrees to follow up as recommended with their PCP in 24 - 48 hours. They also agree with the care-plan and convey that all of their questions have been answered. I have also put together some discharge instructions for them that include: 1) educational information regarding their diagnosis, 2) how to care for their diagnosis at home, as well a 3) list of reasons why they would want to return to the ED prior to their follow-up appointment, should their condition change. DISCHARGE PLAN:  1. Current Discharge Medication List      START taking these medications    Details   naproxen (Naprosyn) 500 mg tablet Take 1 Tablet by mouth two (2) times daily (with meals) for 10 days. Qty: 20 Tablet, Refills: 0  Start date: 8/28/2021, End date: 9/7/2021           2. Follow-up Information     Follow up With Specialties Details Why Contact Info    Moris Estrada MD Orthopedic Surgery Schedule an appointment as soon as possible for a visit in 1 week  [address]  [address] [address]  [phone]      [address] [place] [address] DEPT Emergency Medicine  [facility] wor[facility]  [phone]        3. Return to ED if worse     Diagnosis     Clinical Impression:   1.  Closed fracture of right ankle, initial encounter        Attestations:    Gato SIMMS Isidro Bangura MD    Please note that this dictation was completed with [employer], the computer voice recognition software. Quite often unanticipated grammatical, syntax, homophones, and other interpretive errors are inadvertently transcribed by the computer software. Please disregard these errors. Please excuse any errors that have escaped final proofreading. Thank you.

## 2021-11-09 ENCOUNTER — APPOINTMENT (OUTPATIENT)
Dept: GENERAL RADIOLOGY | Age: 46
End: 2021-11-09
Attending: NURSE PRACTITIONER
Payer: MEDICAID

## 2021-11-09 ENCOUNTER — HOSPITAL ENCOUNTER (EMERGENCY)
Age: 46
Discharge: HOME | End: 2021-11-09
Attending: EMERGENCY MEDICINE
Payer: MEDICAID

## 2021-11-09 VITALS
DIASTOLIC BLOOD PRESSURE: 81 MMHG | BODY MASS INDEX: 39.56 KG/M2 | SYSTOLIC BLOOD PRESSURE: 142 MMHG | TEMPERATURE: 98.8 F | WEIGHT: 215 LBS | RESPIRATION RATE: 22 BRPM | HEIGHT: 62 IN | OXYGEN SATURATION: 99 % | HEART RATE: 69 BPM

## 2021-11-09 DIAGNOSIS — R07.9 CHEST PAIN, UNSPECIFIED TYPE: Primary | ICD-10-CM

## 2021-11-09 DIAGNOSIS — F19.10 SUBSTANCE ABUSE (HCC): ICD-10-CM

## 2021-11-09 LAB
ALBUMIN SERPL-MCNC: 2.9 G/DL (ref 3.5–5)
ALBUMIN/GLOB SERPL: 0.7 {RATIO} (ref 1.1–2.2)
ALP SERPL-CCNC: 172 U/L (ref 45–117)
ALT SERPL-CCNC: 28 U/L (ref 12–78)
AMPHET UR QL SCN: NEGATIVE
ANION GAP SERPL CALC-SCNC: 7 MMOL/L (ref 5–15)
APPEARANCE UR: CLEAR
AST SERPL-CCNC: 27 U/L (ref 15–37)
BACTERIA URNS QL MICRO: ABNORMAL /HPF
BARBITURATES UR QL SCN: NEGATIVE
BASOPHILS # BLD: 0.1 K/UL (ref 0–0.1)
BASOPHILS NFR BLD: 1 % (ref 0–1)
BENZODIAZ UR QL: NEGATIVE
BILIRUB SERPL-MCNC: 0.2 MG/DL (ref 0.2–1)
BILIRUB UR QL: NEGATIVE
BNP SERPL-MCNC: 170 PG/ML (ref 0–125)
BUN SERPL-MCNC: 7 MG/DL (ref 6–20)
BUN/CREAT SERPL: 9 (ref 12–20)
CALCIUM SERPL-MCNC: 8.1 MG/DL (ref 8.5–10.1)
CANNABINOIDS UR QL SCN: NEGATIVE
CHLORIDE SERPL-SCNC: 103 MMOL/L (ref 97–108)
CO2 SERPL-SCNC: 28 MMOL/L (ref 21–32)
COCAINE UR QL SCN: POSITIVE
COLOR UR: ABNORMAL
CREAT SERPL-MCNC: 0.77 MG/DL (ref 0.55–1.02)
DIFFERENTIAL METHOD BLD: ABNORMAL
DRUG SCRN COMMENT,DRGCM: ABNORMAL
EOSINOPHIL # BLD: 0.1 K/UL (ref 0–0.4)
EOSINOPHIL NFR BLD: 1 % (ref 0–7)
EPITH CASTS URNS QL MICRO: ABNORMAL /LPF
ERYTHROCYTE [DISTWIDTH] IN BLOOD BY AUTOMATED COUNT: 17.8 % (ref 11.5–14.5)
GLOBULIN SER CALC-MCNC: 4.4 G/DL (ref 2–4)
GLUCOSE SERPL-MCNC: 102 MG/DL (ref 65–100)
GLUCOSE UR STRIP.AUTO-MCNC: NEGATIVE MG/DL
HCT VFR BLD AUTO: 39.9 % (ref 35–47)
HGB BLD-MCNC: 12.1 G/DL (ref 11.5–16)
HGB UR QL STRIP: NEGATIVE
IMM GRANULOCYTES # BLD AUTO: 0 K/UL (ref 0–0.04)
IMM GRANULOCYTES NFR BLD AUTO: 0 % (ref 0–0.5)
KETONES UR QL STRIP.AUTO: NEGATIVE MG/DL
LEUKOCYTE ESTERASE UR QL STRIP.AUTO: NEGATIVE
LYMPHOCYTES # BLD: 2.2 K/UL (ref 0.8–3.5)
LYMPHOCYTES NFR BLD: 24 % (ref 12–49)
MCH RBC QN AUTO: 24 PG (ref 26–34)
MCHC RBC AUTO-ENTMCNC: 30.3 G/DL (ref 30–36.5)
MCV RBC AUTO: 79 FL (ref 80–99)
METHADONE UR QL: POSITIVE
MONOCYTES # BLD: 0.3 K/UL (ref 0–1)
MONOCYTES NFR BLD: 4 % (ref 5–13)
NEUTS SEG # BLD: 6.4 K/UL (ref 1.8–8)
NEUTS SEG NFR BLD: 70 % (ref 32–75)
NITRITE UR QL STRIP.AUTO: NEGATIVE
NRBC # BLD: 0 K/UL (ref 0–0.01)
NRBC BLD-RTO: 0 PER 100 WBC
OPIATES UR QL: NEGATIVE
PCP UR QL: NEGATIVE
PH UR STRIP: 5.5 [PH] (ref 5–8)
PLATELET # BLD AUTO: 295 K/UL (ref 150–400)
PMV BLD AUTO: 11.4 FL (ref 8.9–12.9)
POTASSIUM SERPL-SCNC: 4.1 MMOL/L (ref 3.5–5.1)
PROT SERPL-MCNC: 7.3 G/DL (ref 6.4–8.2)
PROT UR STRIP-MCNC: NEGATIVE MG/DL
RBC # BLD AUTO: 5.05 M/UL (ref 3.8–5.2)
RBC #/AREA URNS HPF: ABNORMAL /HPF (ref 0–5)
SODIUM SERPL-SCNC: 138 MMOL/L (ref 136–145)
SP GR UR REFRACTOMETRY: 1.01 (ref 1–1.03)
TROPONIN-HIGH SENSITIVITY: 4 NG/L (ref 0–51)
UA: UC IF INDICATED,UAUC: ABNORMAL
UROBILINOGEN UR QL STRIP.AUTO: 0.2 EU/DL (ref 0.2–1)
WBC # BLD AUTO: 9.2 K/UL (ref 3.6–11)
WBC URNS QL MICRO: ABNORMAL /HPF (ref 0–4)

## 2021-11-09 PROCEDURE — 84484 ASSAY OF TROPONIN QUANT: CPT

## 2021-11-09 PROCEDURE — 81001 URINALYSIS AUTO W/SCOPE: CPT

## 2021-11-09 PROCEDURE — 85025 COMPLETE CBC W/AUTO DIFF WBC: CPT

## 2021-11-09 PROCEDURE — 80053 COMPREHEN METABOLIC PANEL: CPT

## 2021-11-09 PROCEDURE — 36415 COLL VENOUS BLD VENIPUNCTURE: CPT

## 2021-11-09 PROCEDURE — 71045 X-RAY EXAM CHEST 1 VIEW: CPT

## 2021-11-09 PROCEDURE — 99284 EMERGENCY DEPT VISIT MOD MDM: CPT

## 2021-11-09 PROCEDURE — 80307 DRUG TEST PRSMV CHEM ANLYZR: CPT

## 2021-11-09 PROCEDURE — 93005 ELECTROCARDIOGRAM TRACING: CPT

## 2021-11-09 PROCEDURE — 83880 ASSAY OF NATRIURETIC PEPTIDE: CPT

## 2021-11-09 RX ORDER — GUAIFENESIN 100 MG/5ML
81 LIQUID (ML) ORAL DAILY
Qty: 90 TABLET | Refills: 0 | Status: SHIPPED | OUTPATIENT
Start: 2021-11-09

## 2021-11-10 LAB
ATRIAL RATE: 72 BPM
CALCULATED P AXIS, ECG09: 52 DEGREES
CALCULATED R AXIS, ECG10: 44 DEGREES
CALCULATED T AXIS, ECG11: 51 DEGREES
DIAGNOSIS, 93000: NORMAL
P-R INTERVAL, ECG05: 124 MS
Q-T INTERVAL, ECG07: 420 MS
QRS DURATION, ECG06: 80 MS
QTC CALCULATION (BEZET), ECG08: 459 MS
VENTRICULAR RATE, ECG03: 72 BPM

## 2021-11-10 NOTE — ED NOTES
Pt is alert and oriented x4. Respirations are even and unlabored. Skin is warm and dry. Pt to ED for mid intermittent chest pain x1 week. Pt reports feels like a \"heaviness and my chest is tight. \" Pt resting in stretcher with boyfriend at bedside in NAD at this time. Pt states that she regularly smokes crack cocaine and also attends a methadone clinic daily. Pt denies any N/V/D or respiratory complaints. Bed in lowest locked position. Call bell within reach. Emergency Department Nursing Plan of Care       The Nursing Plan of Care is developed from the Nursing assessment and Emergency Department Attending provider initial evaluation. The plan of care may be reviewed in the ED Provider note.     The Plan of Care was developed with the following considerations:   Patient / Family readiness to learn indicated by:verbalized understanding  Persons(s) to be included in education: patient  Barriers to Learning/Limitations:No    Signed     Nathalie Loza RN    11/9/2021   8:20 PM

## 2021-11-10 NOTE — ED PROVIDER NOTES
EMERGENCY DEPARTMENT HISTORY AND PHYSICAL EXAM      Date: 11/9/2021  Patient Name: Carlyle Duverney    History of Presenting Illness     Chief Complaint   Patient presents with    Chest Pain       History Provided By: Patient    Additional History (Context): Carlyle Duverney is a 48 y.o. female with No significant past medical history who presents with chest pain. Onset 1 week ago. Reports intermittent but unsure of how long it lasts. States pain located around her breasts. Associated with sweating and intermittent pain in BUE. Denies numbness, tingling, SOB, cough. No injury to chest. No recent air/car travel, hx of immobilizations or surgeries, hx of DVT or PE. Reports cocaine use yesterday. She also reports being in methadone clinic. Reports similar sx in the past and last admitted 5 months ago with chest pain secondary to cocaine use     PCP: None    Current Outpatient Medications   Medication Sig Dispense Refill    aspirin 81 mg chewable tablet Take 1 Tablet by mouth daily. 90 Tablet 0    methadone (DOLOPHINE) 10 mg/mL solution Take 100 mg by mouth daily. Past History     Past Medical History:  Past Medical History:   Diagnosis Date    Hepatitis C     Infectious disease     hepatitis C       Past Surgical History:  History reviewed. No pertinent surgical history. Family History:  History reviewed. No pertinent family history. Social History:  Social History     Tobacco Use    Smoking status: Current Every Day Smoker     Packs/day: 1.00    Smokeless tobacco: Never Used   Substance Use Topics    Alcohol use: No    Drug use: Yes     Types: Cocaine     Comment: former heroin user, admits to regularly smoking cocaine. Allergies: Allergies   Allergen Reactions    Lortab [Hydrocodone-Acetaminophen] Rash     Pt states she had Lortab recently and did not have a reaction. Pt denies. 05/17/19- Pt denies allergy to this.      Tramadol Other (comments)     05/17/19 - Pt denies allergy to this.          Review of Systems   Review of Systems   Constitutional: Negative for appetite change, chills, fatigue and fever. HENT: Negative for congestion, ear pain and rhinorrhea. Eyes: Negative for pain and itching. Respiratory: Negative for cough, chest tightness, shortness of breath and wheezing. Cardiovascular: Positive for chest pain. Negative for palpitations and leg swelling. Gastrointestinal: Negative for abdominal pain, nausea and vomiting. Genitourinary: Negative for dysuria, frequency and urgency. Musculoskeletal: Negative for arthralgias, back pain and joint swelling. Skin: Negative for color change and rash. Neurological: Negative for dizziness, weakness, numbness and headaches. All other systems reviewed and are negative. Physical Exam     Vitals:    11/09/21 1934 11/09/21 2145   BP: (!) 140/89 (!) 142/81   Pulse: 83 69   Resp: 19 22   Temp: 98.8 °F (37.1 °C)    SpO2: 97% 99%   Weight: 97.5 kg (215 lb)    Height: 5' 2\" (1.575 m)      Physical Exam  Vitals and nursing note reviewed. Constitutional:       General: She is not in acute distress. Appearance: She is well-developed. She is not ill-appearing. HENT:      Head: Normocephalic and atraumatic. Right Ear: Tympanic membrane and ear canal normal.      Left Ear: Tympanic membrane and ear canal normal.      Nose: Nose normal.      Mouth/Throat:      Mouth: Mucous membranes are moist.      Pharynx: Oropharynx is clear. No oropharyngeal exudate or posterior oropharyngeal erythema. Eyes:      Extraocular Movements: Extraocular movements intact. Conjunctiva/sclera: Conjunctivae normal.      Pupils: Pupils are equal, round, and reactive to light. Cardiovascular:      Rate and Rhythm: Normal rate and regular rhythm. Pulses: Normal pulses. Heart sounds: Normal heart sounds. Pulmonary:      Effort: Pulmonary effort is normal.      Breath sounds: Normal breath sounds.    Abdominal:      General: Bowel sounds are normal.      Palpations: Abdomen is soft. Tenderness: There is no abdominal tenderness. There is no guarding or rebound. Musculoskeletal:      Cervical back: Normal range of motion and neck supple. Skin:     General: Skin is warm and dry. Neurological:      Mental Status: She is alert and oriented to person, place, and time. Diagnostic Study Results     Labs -     Recent Results (from the past 12 hour(s))   EKG, 12 LEAD, INITIAL    Collection Time: 11/09/21  7:51 PM   Result Value Ref Range    Ventricular Rate 72 BPM    Atrial Rate 72 BPM    P-R Interval 124 ms    QRS Duration 80 ms    Q-T Interval 420 ms    QTC Calculation (Bezet) 459 ms    Calculated P Axis 52 degrees    Calculated R Axis 44 degrees    Calculated T Axis 51 degrees    Diagnosis       Normal sinus rhythm  Septal infarct (cited on or before 08-FEB-2015)  Abnormal ECG  When compared with ECG of 28-JUL-2021 16:44,  T wave inversion no longer evident in Anterior leads     METABOLIC PANEL, COMPREHENSIVE    Collection Time: 11/09/21  8:00 PM   Result Value Ref Range    Sodium 138 136 - 145 mmol/L    Potassium 4.1 3.5 - 5.1 mmol/L    Chloride 103 97 - 108 mmol/L    CO2 28 21 - 32 mmol/L    Anion gap 7 5 - 15 mmol/L    Glucose 102 (H) 65 - 100 mg/dL    BUN 7 6 - 20 MG/DL    Creatinine 0.77 0.55 - 1.02 MG/DL    BUN/Creatinine ratio 9 (L) 12 - 20      GFR est AA >60 >60 ml/min/1.73m2    GFR est non-AA >60 >60 ml/min/1.73m2    Calcium 8.1 (L) 8.5 - 10.1 MG/DL    Bilirubin, total 0.2 0.2 - 1.0 MG/DL    ALT (SGPT) 28 12 - 78 U/L    AST (SGOT) 27 15 - 37 U/L    Alk.  phosphatase 172 (H) 45 - 117 U/L    Protein, total 7.3 6.4 - 8.2 g/dL    Albumin 2.9 (L) 3.5 - 5.0 g/dL    Globulin 4.4 (H) 2.0 - 4.0 g/dL    A-G Ratio 0.7 (L) 1.1 - 2.2     CBC WITH AUTOMATED DIFF    Collection Time: 11/09/21  8:00 PM   Result Value Ref Range    WBC 9.2 3.6 - 11.0 K/uL    RBC 5.05 3.80 - 5.20 M/uL    HGB 12.1 11.5 - 16.0 g/dL    HCT 39.9 35.0 - 47.0 %    MCV 79.0 (L) 80.0 - 99.0 FL    MCH 24.0 (L) 26.0 - 34.0 PG    MCHC 30.3 30.0 - 36.5 g/dL    RDW 17.8 (H) 11.5 - 14.5 %    PLATELET 535 874 - 519 K/uL    MPV 11.4 8.9 - 12.9 FL    NRBC 0.0 0  WBC    ABSOLUTE NRBC 0.00 0.00 - 0.01 K/uL    NEUTROPHILS 70 32 - 75 %    LYMPHOCYTES 24 12 - 49 %    MONOCYTES 4 (L) 5 - 13 %    EOSINOPHILS 1 0 - 7 %    BASOPHILS 1 0 - 1 %    IMMATURE GRANULOCYTES 0 0.0 - 0.5 %    ABS. NEUTROPHILS 6.4 1.8 - 8.0 K/UL    ABS. LYMPHOCYTES 2.2 0.8 - 3.5 K/UL    ABS. MONOCYTES 0.3 0.0 - 1.0 K/UL    ABS. EOSINOPHILS 0.1 0.0 - 0.4 K/UL    ABS. BASOPHILS 0.1 0.0 - 0.1 K/UL    ABS. IMM.  GRANS. 0.0 0.00 - 0.04 K/UL    DF AUTOMATED     NT-PRO BNP    Collection Time: 11/09/21  8:00 PM   Result Value Ref Range    NT pro- (H) 0 - 125 PG/ML   TROPONIN-HIGH SENSITIVITY    Collection Time: 11/09/21  8:00 PM   Result Value Ref Range    Troponin-High Sensitivity 4 0 - 51 ng/L   DRUG SCREEN, URINE    Collection Time: 11/09/21  9:02 PM   Result Value Ref Range    AMPHETAMINES Negative NEG      BARBITURATES Negative NEG      BENZODIAZEPINES Negative NEG      COCAINE Positive (A) NEG      METHADONE Positive (A) NEG      OPIATES Negative NEG      PCP(PHENCYCLIDINE) Negative NEG      THC (TH-CANNABINOL) Negative NEG      Drug screen comment (NOTE)    URINALYSIS W/ REFLEX CULTURE    Collection Time: 11/09/21  9:02 PM    Specimen: Urine   Result Value Ref Range    Color YELLOW/STRAW      Appearance CLEAR CLEAR      Specific gravity 1.015 1.003 - 1.030      pH (UA) 5.5 5.0 - 8.0      Protein Negative NEG mg/dL    Glucose Negative NEG mg/dL    Ketone Negative NEG mg/dL    Bilirubin Negative NEG      Blood Negative NEG      Urobilinogen 0.2 0.2 - 1.0 EU/dL    Nitrites Negative NEG      Leukocyte Esterase Negative NEG      WBC 0-4 0 - 4 /hpf    RBC 0-5 0 - 5 /hpf    Epithelial cells FEW FEW /lpf    Bacteria 1+ (A) NEG /hpf    UA:UC IF INDICATED CULTURE NOT INDICATED BY UA RESULT CNI Radiologic Studies -   XR CHEST PORT   Final Result      No acute process on portable chest. No change. CT Results  (Last 48 hours)    None        CXR Results  (Last 48 hours)               11/09/21 1959  XR CHEST PORT Final result    Impression:      No acute process on portable chest. No change. Narrative:  EXAM: XR CHEST PORT       INDICATION: Chest pain for one week. COMPARISON: Chest views on 7/20/2021 and 6/9/2021. CT chest on 2/8/2015. TECHNIQUE: Upright portable chest AP view       FINDINGS: Cardiac monitoring wires overlie the thorax. The cardiomediastinal and   hilar contours are within normal limits. The pulmonary vasculature is within   normal limits. The lungs and pleural spaces are clear. The visualized bones and upper abdomen   are age-appropriate. Medical Decision Making   I am the first provider for this patient. I reviewed the vital signs, available nursing notes, past medical history, past surgical history, family history and social history. Vital Signs-Reviewed the patient's vital signs. Records Reviewed: Nursing Notes, Old Medical Records, Previous Radiology Studies and Previous Laboratory Studies       ED COURSE:   Initial assessment performed. The patients presenting problems have been discussed, and they are in agreement with the care plan formulated and outlined with them. I have encouraged them to ask questions as they arise throughout their visit. ED Course:   ED Course as of 11/09/21 2308 Tue Nov 09, 2021 2025 Progress Note:   Xray unremarkable. Awaiting labs at this time [NA]   2209 Progress Note:   Mild elevation in BNP but CXR shows no signs of cardiomegaly or pleural effusion. Trop is negative and cleared for AMI. Pt states she is unsure how many times she has used cocaine within the last week but > 3times. I suspect these are coronary spams due to cocaine use.  Discussed substance abuse rehab in addition I recommend cardiology follow up for outpatient stress testing  [NA]      ED Course User Index  [NA] Gale Boss NP         Disposition:  Discharge   DISCHARGE NOTE:     Pt has been reexamined. Patient has no new complaints, changes, or physical findings. Care plan outlined and precautions discussed. All of pt's questions and concerns were addressed. Patient was instructed and agrees to follow up with PCP as well as to return to the ED upon further deterioration. Patient is ready to go home. Follow-up Information     Follow up With Specialties Details Why Contact Info    Nestor Jones  [facility] Vascular Surgery, Nurse Practitioner, Cardiology Schedule an appointment as soon as possible for a visit  evaluation of chest pain [address] [place] [address]  [phone]            Discharge Medication List as of 11/9/2021  9:58 PM          Provider Notes (Medical Decision Making):     Procedures:  Procedures      Diagnosis     Clinical Impression:   1. Chest pain, unspecified type    2.  Substance abuse ([facility] [address]

## 2021-11-10 NOTE — DISCHARGE INSTRUCTIONS
It was a pleasure taking care of you at [facility] Emergency Department today. We know that when you come to [facility], you are entrusting us with your health, comfort, and safety. Our physicians and nurses honor that trust, and we truly appreciate the opportunity to care for you and your loved ones. We also value our feedback. If you receive a survey about your Emergency Department experience today, please fill it out. We care about our patients' feedback, and we listen to what you have to say. Thank you!

## 2021-11-10 NOTE — ED TRIAGE NOTES
Per pt reports intermittent, throbbing, left sided chest pain, +sob that started a week ago and left arm numbness.

## 2021-11-19 ENCOUNTER — OFFICE VISIT (OUTPATIENT)
Dept: CARDIOLOGY CLINIC | Age: 46
End: 2021-11-19
Payer: MEDICAID

## 2021-11-19 VITALS
SYSTOLIC BLOOD PRESSURE: 108 MMHG | BODY MASS INDEX: 40.74 KG/M2 | DIASTOLIC BLOOD PRESSURE: 75 MMHG | HEART RATE: 80 BPM | OXYGEN SATURATION: 97 % | WEIGHT: 221.4 LBS | RESPIRATION RATE: 19 BRPM | HEIGHT: 62 IN

## 2021-11-19 DIAGNOSIS — R07.9 CHEST PAIN, UNSPECIFIED TYPE: Primary | ICD-10-CM

## 2021-11-19 DIAGNOSIS — R06.83 LOUD SNORING: ICD-10-CM

## 2021-11-19 DIAGNOSIS — Z09 HOSPITAL DISCHARGE FOLLOW-UP: ICD-10-CM

## 2021-11-19 PROCEDURE — 93000 ELECTROCARDIOGRAM COMPLETE: CPT | Performed by: NURSE PRACTITIONER

## 2021-11-19 PROCEDURE — 1111F DSCHRG MED/CURRENT MED MERGE: CPT | Performed by: NURSE PRACTITIONER

## 2021-11-19 PROCEDURE — 99214 OFFICE O/P EST MOD 30 MIN: CPT | Performed by: NURSE PRACTITIONER

## 2021-11-19 RX ORDER — NAPROXEN 500 MG/1
TABLET ORAL
COMMUNITY
Start: 2021-11-15

## 2021-11-19 NOTE — PROGRESS NOTES
Chief Complaint   Patient presents with   [facility] Follow Up     [facility] ED on 11/09/21 d/t Chest Pain    Chest Pain     occasional,     Shortness of Breath

## 2021-11-19 NOTE — PROGRESS NOTES
[facility] @ [address], [place]  Subjective/HPI: Trudi Liriano is a 48 y.o. female with a history of polysubstance abuse crack cocaine and heroin currently in methadone clinic but unfortunately continues to smoke crack cocaine twice a week is here for hospital follow-up presented to the emergency room 10 days ago for onset of chest pain after smoking cocaine. Her chest pain is described as an ache and pressure as well as heaviness in her arms that can last several minutes during or shortly after smoking cocaine. She denies exertional chest pain. She was previously admitted June of this year for similar situation, she was admitted, performed nuclear stress test demonstrating no ischemia or infarct and normal systolic function on echo. Nuclear stress test 6/2021  Impression:   Normal gated rest/stress myocardial perfusion imaging study. No evidence of myocardial ischemia or infarction. Left ventricular ejection fraction is 67 %. Echocardiogram 6/2021  Echo Findings    Left Ventricle Normal cavity size and systolic function (ejection fraction normal). Mild concentric hypertrophy. The estimated EF is 60 - 65%. There is mild (grade 1) left ventricular diastolic dysfunction. Left Atrium Normal cavity size. Right Ventricle Normal cavity size and global systolic function. Right Atrium Normal cavity size. Aortic Valve Normal valve structure, no stenosis and no regurgitation. Mitral Valve Normal valve structure and no stenosis. Trace regurgitation. Tricuspid Valve Normal valve structure and no stenosis. Trace regurgitation. Pulmonic Valve Normal valve structure and no stenosis. Trace regurgitation. Aorta Normal aortic root. Pulmonary Artery Pulmonary hypertension not suggested by Doppler findings. Pericardium No evidence of pericardial effusion.        PCP Provider  None  Past Medical History:   Diagnosis Date    Hepatitis C     Infectious disease     hepatitis C      History reviewed. No pertinent surgical history. Allergies   Allergen Reactions    Lortab [Hydrocodone-Acetaminophen] Rash     Pt states she had Lortab recently and did not have a reaction. Pt denies. 05/17/19- Pt denies allergy to this.  Tramadol Other (comments)     05/17/19 - Pt denies allergy to this. Family History   Problem Relation Age of Onset    Cancer Mother       Current Outpatient Medications   Medication Sig    aspirin 81 mg chewable tablet Take 1 Tablet by mouth daily.  methadone (DOLOPHINE) 10 mg/mL solution Take 115 mg by mouth daily.  naproxen (NAPROSYN) 500 mg tablet take 1 tablet by mouth twice a day AS NEEDED FOR PAIN take with food or milk (Patient not taking: Reported on 11/19/2021)     No current facility-administered medications for this visit. Vitals:    11/19/21 1509   BP: 108/75   Pulse: 80   Resp: 19   SpO2: 97%   Weight: 221 lb 6.4 oz (100.4 kg)   Height: 5' 2\" (1.575 m)     Social History     Socioeconomic History    Marital status: SINGLE     Spouse name: Not on file    Number of children: Not on file    Years of education: Not on file    Highest education level: Not on file   Occupational History    Not on file   Tobacco Use    Smoking status: Current Every Day Smoker     Packs/day: 1.00    Smokeless tobacco: Never Used   Substance and Sexual Activity    Alcohol use: No    Drug use: Yes     Types: Cocaine     Comment: former heroin user, admits to regularly smoking cocaine.     Sexual activity: Yes     Partners: Male   Other Topics Concern    Not on file   Social History Narrative    Not on file     Social Determinants of Health     Financial Resource Strain:     Difficulty of Paying Living Expenses: Not on file   Food Insecurity:     Worried About Running Out of Food in the Last Year: Not on file    Isabel of Food in the Last Year: Not on file   Transportation Needs:     Lack of Transportation (Medical): Not on file    Lack of Transportation (Non-Medical): Not on file   Physical Activity:     Days of Exercise per Week: Not on file    Minutes of Exercise per Session: Not on file   Stress:     Feeling of Stress : Not on file   Social Connections:     Frequency of Communication with Friends and Family: Not on file    Frequency of Social Gatherings with Friends and Family: Not on file    Attends Oriental orthodox Services: Not on file    Active Member of [employer] or Organizations: Not on file    Attends Club or Organization Meetings: Not on file    Marital Status: Not on file   Intimate Partner Violence:     Fear of Current or Ex-Partner: Not on file    Emotionally Abused: Not on file    Physically Abused: Not on file    Sexually Abused: Not on file   Housing Stability:     Unable to Pay for Housing in the Last Year: Not on file    Number of [place] in the Last Year: Not on file    Unstable Housing in the Last Year: Not on file       I have reviewed the nurses notes, vitals, problem list, allergy list, medical history, family, social history and medications. Review of Symptoms  11 systems reviewed, negative other than as stated in the HPI      Physical Exam:      General: Well developed, in no acute distress, cooperative and alert  HEENT: No carotid bruits, no JVD, trach is midline. Neck Supple, PERRL, EOM intact. Heart:  Normal S1/S2 negative S3 or S4. Regular, no murmur, gallop or rub. Respiratory: Clear bilaterally x 4, no wheezing or rales  Abdomen:   Soft, non-tender, no masses, bowel sounds are active. Extremities:  No edema, normal cap refill, no cyanosis, atraumatic. Neuro: A&Ox3, speech clear, gait stable. Skin: Skin color is normal. No rashes or lesions.  Non diaphoretic  Vascular: 2+ pulses symmetric in all extremities    Cardiographics    ECG: ER EKG reviewed        Cardiology Labs:  Lab Results   Component Value Date/Time    Cholesterol, total 217 (H) 06/09/2021 07:42 PM HDL Cholesterol 84 06/09/2021 07:42 PM    LDL, calculated 118.6 (H) 06/09/2021 07:42 PM    Triglyceride 72 06/09/2021 07:42 PM    CHOL/HDL Ratio 2.6 06/09/2021 07:42 PM       Lab Results   Component Value Date/Time    Sodium 138 11/09/2021 08:00 PM    Potassium 4.1 11/09/2021 08:00 PM    Chloride 103 11/09/2021 08:00 PM    CO2 28 11/09/2021 08:00 PM    Anion gap 7 11/09/2021 08:00 PM    Glucose 102 (H) 11/09/2021 08:00 PM    BUN 7 11/09/2021 08:00 PM    Creatinine 0.77 11/09/2021 08:00 PM    BUN/Creatinine ratio 9 (L) 11/09/2021 08:00 PM    GFR est AA >60 11/09/2021 08:00 PM    GFR est non-AA >60 11/09/2021 08:00 PM    Calcium 8.1 (L) 11/09/2021 08:00 PM    Bilirubin, total 0.2 11/09/2021 08:00 PM    Alk. phosphatase 172 (H) 11/09/2021 08:00 PM    Protein, total 7.3 11/09/2021 08:00 PM    Albumin 2.9 (L) 11/09/2021 08:00 PM    Globulin 4.4 (H) 11/09/2021 08:00 PM    A-G Ratio 0.7 (L) 11/09/2021 08:00 PM    ALT (SGPT) 28 11/09/2021 08:00 PM           Assessment:     Assessment:     Diagnoses and all orders for this visit:    1. Chest pain, unspecified type  -     AMB POC EKG ROUTINE W/ 12 LEADS, INTER & REP        ICD-10-CM ICD-9-CM    1. Chest pain, unspecified type  R07.9 786.50 AMB POC EKG ROUTINE W/ 12 LEADS, INTER & REP     Orders Placed This Encounter    AMB POC EKG ROUTINE W/ 12 LEADS, INTER & REP     Order Specific Question:   Reason for Exam:     Answer:   routine    naproxen (NAPROSYN) 500 mg tablet     Sig: take 1 tablet by mouth twice a day AS NEEDED FOR PAIN take with food or milk        Plan:     Patient is a 78-year-old female with a history of polysubstance abuse crack cocaine and heroin. Previous work-up earlier this year demonstrated nuclear stress test as well as structurally normal heart on echocardiogram.  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 Congratulated on current maintenance of methadone treatment plan.   Follow-up as needed. Clearly understands at the end of the visit the detrimental effect of her polysubstance abuse to her health. Enmanuel rTinidad NP      Please note that this dictation was completed with [employer], the computer voice recognition software. Quite often unanticipated grammatical, syntax, homophones, and other interpretive errors are inadvertently transcribed by the computer software. Please disregard these errors. Please excuse any errors that have escaped final proofreading. Thank you.

## 2022-02-01 ENCOUNTER — HOSPITAL ENCOUNTER (EMERGENCY)
Age: 47
Discharge: HOME | End: 2022-02-01
Attending: EMERGENCY MEDICINE
Payer: MEDICAID

## 2022-02-01 VITALS
HEART RATE: 97 BPM | SYSTOLIC BLOOD PRESSURE: 168 MMHG | BODY MASS INDEX: 40.67 KG/M2 | HEIGHT: 62 IN | WEIGHT: 221 LBS | DIASTOLIC BLOOD PRESSURE: 89 MMHG | RESPIRATION RATE: 16 BRPM | TEMPERATURE: 98.7 F | OXYGEN SATURATION: 97 %

## 2022-02-01 DIAGNOSIS — S60.419A ABRASION OF FINGER, INITIAL ENCOUNTER: Primary | ICD-10-CM

## 2022-02-01 DIAGNOSIS — Z23 NEED FOR TETANUS BOOSTER: ICD-10-CM

## 2022-02-01 PROCEDURE — 90471 IMMUNIZATION ADMIN: CPT

## 2022-02-01 PROCEDURE — 99283 EMERGENCY DEPT VISIT LOW MDM: CPT

## 2022-02-01 PROCEDURE — 90715 TDAP VACCINE 7 YRS/> IM: CPT | Performed by: EMERGENCY MEDICINE

## 2022-02-01 PROCEDURE — 74011250636 HC RX REV CODE- 250/636: Performed by: EMERGENCY MEDICINE

## 2022-02-01 RX ADMIN — TETANUS TOXOID, REDUCED DIPHTHERIA TOXOID AND ACELLULAR PERTUSSIS VACCINE, ADSORBED 0.5 ML: 5; 2.5; 8; 8; 2.5 SUSPENSION INTRAMUSCULAR at 08:47

## 2022-02-01 NOTE — ED PROVIDER NOTES
EMERGENCY DEPARTMENT HISTORY AND PHYSICAL EXAM      Date: 2/1/2022  Patient Name: Rojas Sanchez    History of Presenting Illness     Chief Complaint   Patient presents with    Finger Pain     History Provided By: Patient    HPI: Rojas Sanchez, 48 y.o. female with medical history significant for hepatitis C who presents via private vehicle to the ED with cc of pain and injury to the left 3rd digit. Patient states she dropped a piece of glass and went to pick it up and got multiple shards of glass and the left finger. She cleaned it with peroxide and alcohol and believes she got all of the glass out. She is here today to make sure it is not infected. She is right-hand dominant. She denies any other symptoms. PMHx: Type C  Social Hx: Smokes 1 pack/day, denies alcohol use, frequent crack cocaine use    PCP: None    There are no other complaints, changes, or physical findings at this time. No current facility-administered medications on file prior to encounter. Current Outpatient Medications on File Prior to Encounter   Medication Sig Dispense Refill    methadone (DOLOPHINE) 10 mg/mL solution Take 115 mg by mouth daily.  naproxen (NAPROSYN) 500 mg tablet take 1 tablet by mouth twice a day AS NEEDED FOR PAIN take with food or milk (Patient not taking: Reported on 11/19/2021)      aspirin 81 mg chewable tablet Take 1 Tablet by mouth daily. (Patient not taking: Reported on 2/1/2022) 90 Tablet 0     Past History     Past Medical History:  Past Medical History:   Diagnosis Date    Hepatitis C     Hepatitis C     Infectious disease     hepatitis C     Past Surgical History:  No past surgical history on file.   Family History:  Family History   Problem Relation Age of Onset    Cancer Mother      Social History:  Social History     Tobacco Use    Smoking status: Current Every Day Smoker     Packs/day: 1.00    Smokeless tobacco: Never Used   Vaping Use    Vaping Use: Every day   Substance Use Topics    Alcohol use: No    Drug use: Yes     Types: Cocaine     Comment: former heroin user, admits to regularly smoking cocaine. Allergies: Allergies   Allergen Reactions    Lortab [Hydrocodone-Acetaminophen] Rash     Pt states she had Lortab recently and did not have a reaction. Pt denies. 05/17/19- Pt denies allergy to this.  Tramadol Other (comments)     05/17/19 - Pt denies allergy to this. Review of Systems   Review of Systems   Constitutional: Negative for chills and fever. HENT: Negative for congestion, rhinorrhea, sneezing and sore throat. Eyes: Negative for redness and visual disturbance. Respiratory: Negative for shortness of breath. Cardiovascular: Negative for leg swelling. Gastrointestinal: Negative for abdominal pain, nausea and vomiting. Genitourinary: Negative for difficulty urinating and frequency. Musculoskeletal: Negative for back pain, myalgias and neck stiffness. Skin: Positive for wound. Negative for rash. Neurological: Negative for dizziness, syncope, weakness and headaches. Hematological: Negative for adenopathy. All other systems reviewed and are negative. Physical Exam   Physical Exam  Vitals and nursing note reviewed. Constitutional:       Appearance: Normal appearance. She is well-developed. HENT:      Head: Normocephalic and atraumatic. Eyes:      Conjunctiva/sclera: Conjunctivae normal.   Cardiovascular:      Rate and Rhythm: Normal rate and regular rhythm. Pulses: Normal pulses. Heart sounds: Normal heart sounds, S1 normal and S2 normal.   Pulmonary:      Effort: Pulmonary effort is normal. No respiratory distress. Breath sounds: Normal breath sounds. No wheezing. Abdominal:      General: Bowel sounds are normal. There is no distension. Palpations: Abdomen is soft. Tenderness: There is no abdominal tenderness. There is no rebound. Musculoskeletal:         General: Normal range of motion.       Cervical back: Full passive range of motion without pain, normal range of motion and neck supple. Skin:     General: Skin is warm and dry. Findings: Abrasion present. No rash. Comments: Small abrasion to the dorsal aspect of the left 3rd finger adjacent to the nailbed, no surrounding erythema or active bleeding   Neurological:      Mental Status: She is alert and oriented to person, place, and time. Psychiatric:         Speech: Speech normal.         Behavior: Behavior normal.         Thought Content: Thought content normal.         Judgment: Judgment normal.       Diagnostic Study Results   Labs -   No results found for this or any previous visit (from the past 12 hour(s)). Radiologic Studies -   No orders to display     No results found. Medical Decision Making   I am the first provider for this patient. I reviewed the vital signs, available nursing notes, past medical history, past surgical history, family history and social history. Vital Signs-Reviewed the patient's vital signs. Patient Vitals for the past 24 hrs:   Temp Pulse Resp BP SpO2   02/01/22 0810 -- -- -- -- 97 %   02/01/22 0758 98.7 °F (37.1 °C) 97 16 (!) 168/89 97 %     Pulse Oximetry Analysis - 97% on RA (normal)    Records Reviewed: Nursing Notes and Old Medical Records    Provider Notes (Medical Decision Making):   25-year-old female presents with left finger pain and skin abrasion after cutting herself on a broken piece of glass last night. There is no sign of skin infection. Will have her keep the wound clean with warm soapy water and apply Neosporin twice daily. Patient does not know when her last tetanus shot was. We'll update her Tdap prior to discharge. ED Course:   Initial assessment performed. The patients presenting problems have been discussed, and they are in agreement with the care plan formulated and outlined with them. I have encouraged them to ask questions as they arise throughout their visit.     Progress Note:   Updated pt on all returned results and findings. Discussed the importance of proper follow up as referred below along with return precautions. Pt in agreement with the care plan and expresses agreement with and understanding of all items discussed. Disposition:  Discharge Note:  The pt is ready for discharge. The pt's signs, symptoms, diagnosis, and discharge instructions have been discussed and pt has conveyed their understanding. The pt is to follow up as recommended or return to ER should their symptoms worsen. Plan has been discussed and pt is in agreement. PLAN:  1. Current Discharge Medication List        2. Follow-up Information     Follow up With Specialties Details Why [address]  Call  to arrange primary care [address]  [place], [address] [place] [id number] [phone]    [facility] EMERGENCY DEPT Emergency Medicine  As needed, If symptoms worsen Walter Ion  [phone]        Return to ED if worse     Diagnosis     Clinical Impression:   1. Abrasion of finger, initial encounter    2. Need for tetanus booster            Please note that this dictation was completed with Dragon, computer voice recognition software. Quite often unanticipated grammatical, syntax, homophones, and other interpretive errors are inadvertently transcribed by the computer software. Please disregard these errors. Additionally, please excuse any errors that have escaped final proofreading.

## 2022-02-01 NOTE — ED NOTES
Pt here for left hand 3rd digit pain around the nail bed. Pt states she thinks that she may have gotten glass in the finger and also reports that she has been picking at the site. Pt is alert and oriented x 4, RR even and unlabored, skin is warm and dry. Assessment completed and pt updated on plan of care. Call bell in reach. Emergency Department Nursing Plan of Care       The Nursing Plan of Care is developed from the Nursing assessment and Emergency Department Attending provider initial evaluation. The plan of care may be reviewed in the ED Provider note.     The Plan of Care was developed with the following considerations:   Patient / Family readiness to learn indicated by:verbalized understanding  Persons(s) to be included in education: patient  Barriers to Learning/Limitations:No    Signed     Amber Dela Cruz RN    2/1/2022   11:45 AM

## 2022-07-27 ENCOUNTER — HOSPITAL ENCOUNTER (EMERGENCY)
Age: 47
Discharge: HOME | End: 2022-07-27
Attending: EMERGENCY MEDICINE
Payer: MEDICAID

## 2022-07-27 VITALS
RESPIRATION RATE: 16 BRPM | TEMPERATURE: 98.3 F | DIASTOLIC BLOOD PRESSURE: 103 MMHG | SYSTOLIC BLOOD PRESSURE: 154 MMHG | OXYGEN SATURATION: 96 % | WEIGHT: 225 LBS | HEART RATE: 99 BPM | BODY MASS INDEX: 41.41 KG/M2 | HEIGHT: 62 IN

## 2022-07-27 DIAGNOSIS — L60.8 ONYCHOMADESIS OF TOENAIL: Primary | ICD-10-CM

## 2022-07-27 PROCEDURE — 99283 EMERGENCY DEPT VISIT LOW MDM: CPT

## 2022-07-27 RX ORDER — TERBINAFINE HYDROCHLORIDE 250 MG/1
250 TABLET ORAL DAILY
Qty: 14 TABLET | Refills: 0 | Status: SHIPPED | OUTPATIENT
Start: 2022-07-27 | End: 2022-08-10

## 2022-07-27 NOTE — ED PROVIDER NOTES
EMERGENCY DEPARTMENT HISTORY AND PHYSICAL EXAM       Please note that this dictation was completed with [employer], the computer voice recognition software. Quite often unanticipated grammatical, syntax, homophones, and other interpretive errors are inadvertently transcribed by the computer software. Please disregard these errors. Please excuse any errors that have escaped final proofreading. Date: 7/27/2022  Patient Name: Carlyle Duverney  Patient Age and Sex: 50 y.o. female    History of Presenting Illness     Chief Complaint   Patient presents with    Toe Pain       History Provided By: Patient     Chief Complaint: Toenail discomfort      HPI: Carlyle Duverney, is a 50 y.o. female who presents to the ED with discomfort in her left great toe, specifically around toenail. She is concerned about possibly having an ingrown toenail. She has noticed for multiple weeks that there has been a gradually increasing amount of white debris along toenail, starting very distally along the nail plate but has since moved more proximal.  No trauma to the toe. No fevers, chills, swelling or pain in any of her foot joints. Able to bear weight normally. 4    Pt denies any other alleviating or exacerbating factors. No other associated signs or symptoms. There are no other complaints, changes or physical findings at this time. PCP: None    Past History   All documented elements of the [facility] reviewed and verified by me. -Jordana Parker MD    Past Medical History:  Past Medical History:   Diagnosis Date    Hepatitis C     Hepatitis C     Infectious disease     hepatitis C       Past Surgical History:  No past surgical history on file.     Family History:   Family History   Problem Relation Age of Onset    Cancer Mother        Social History:  Social History     Tobacco Use    Smoking status: Every Day     Packs/day: 1.00     Types: Cigarettes    Smokeless tobacco: Never   Vaping Use    Vaping Use: Every day   Substance Use Topics Alcohol use: No    Drug use: Yes     Types: Cocaine     Comment: former heroin user, admits to regularly smoking cocaine. Current Medications:  No current facility-administered medications on file prior to encounter. Current Outpatient Medications on File Prior to Encounter   Medication Sig Dispense Refill    methadone (DOLOPHINE) 10 mg/mL solution Take 115 mg by mouth daily. naproxen (NAPROSYN) 500 mg tablet take 1 tablet by mouth twice a day AS NEEDED FOR PAIN take with food or milk (Patient not taking: No sig reported)      aspirin 81 mg chewable tablet Take 1 Tablet by mouth daily. (Patient not taking: No sig reported) 90 Tablet 0       Allergies: Allergies   Allergen Reactions    Lortab [Hydrocodone-Acetaminophen] Rash     Pt states she had Lortab recently and did not have a reaction. Pt denies. 05/17/19- Pt denies allergy to this. Tramadol Other (comments)     05/17/19 - Pt denies allergy to this. Review of Systems   All other systems reviewed and negative    Review of Systems   Constitutional:  Negative for fever. HENT: Negative. Eyes: Negative. Respiratory:  Negative for cough and shortness of breath. Cardiovascular:  Negative for chest pain and palpitations. Gastrointestinal:  Negative for abdominal pain, diarrhea and vomiting. Endocrine: Negative. Genitourinary:  Negative for dysuria and hematuria. Musculoskeletal:  Negative for gait problem and joint swelling. Skin:  Positive for rash. Neurological:  Negative for weakness and numbness. Psychiatric/Behavioral: Negative. Negative for confusion. All other systems reviewed and are negative. Physical Exam   Reviewed patients vital signs and nursing note    Physical Exam  Vitals and nursing note reviewed. Constitutional:       Appearance: She is not diaphoretic. HENT:      Head: Atraumatic.       Nose: Nose normal.      Mouth/Throat:      Mouth: Mucous membranes are moist.   Eyes: Extraocular Movements: Extraocular movements intact. Conjunctiva/sclera: Conjunctivae normal.      Pupils: Pupils are equal, round, and reactive to light. Cardiovascular:      Rate and Rhythm: Normal rate and regular rhythm. Pulses: Normal pulses. Dorsalis pedis pulses are 2+ on the right side and 2+ on the left side. Posterior tibial pulses are 2+ on the right side and 2+ on the left side. Heart sounds: Normal heart sounds. Pulmonary:      Effort: Pulmonary effort is normal.      Breath sounds: Normal breath sounds. Abdominal:      Palpations: Abdomen is soft. Tenderness: There is no abdominal tenderness. Musculoskeletal:         General: No tenderness. Normal range of motion. Cervical back: Normal range of motion. No rigidity. Feet:    Feet:      Right foot:      Skin integrity: Skin integrity normal.      Left foot:      Skin integrity: Dry skin present. No ulcer, blister, skin breakdown, erythema, warmth or fissure. Toenail Condition: Left toenails are normal.      Comments: Subungual hyperkeratosis and distal nailbed onycholysis   whitish discoloration of distal nail  no erythema, swelling, warmth to touch. normal rom. Skin:     General: Skin is warm and dry. Capillary Refill: Capillary refill takes less than 2 seconds. Neurological:      General: No focal deficit present. Mental Status: She is alert. Psychiatric:         Mood and Affect: Mood normal.         Behavior: Behavior normal.       Diagnostic Study Results     Labs - I have personally reviewed and interpreted all laboratory results. Interpretation of available and pertinent results detailed below in MDM. Deann Barrera MD, MSc  No results found for this or any previous visit (from the past 24 hour(s)).     Radiologic Studies - I have personally reviewed and interpreted (see [facility] for brief interpreation of available results) all imaging studies and agree with radiology interpretation and report. - Darrick Amezquita MD, MSc  No orders to display         Medical Decision Making   I am the first provider for this patient. Records Reviewed:   I reviewed our electronic medical record system for any past medical records that were available that may contribute to the patient's current condition, including their PMH, surgical history, social and family history. This includes most recent ED visits, any available discharge summaries and prior ECGs, which I have reviewed and interpreted personally. I have summarized most salient findings in my HPI and MDM. Darrick Amezquita MD, MSc    I also reviewed the nursing notes and vital signs from today's visit. Nursing notes will be reviewed as they become available in realtime while the pt has been in the ED. Darrick Amezquita MD, MSc      Vital Signs-Reviewed the patient's vital signs. Patient Vitals for the past 24 hrs:   Temp Pulse Resp BP SpO2   07/27/22 0052 98.3 °F (36.8 °C) 99 16 (!) 154/103 96 %         Provider Notes (Medical Decision Making):   Assessment: patient's exam c/w subungual onychomycosis. No evidence of cellulitis, abscess or paronychia. Will start oral antifungal therapy with understanding that long-term treatment is often required for resolution. Patient will follow up with pmd this week and schedule an appointment with podiatry as soon as able. ED Course:   Initial assessment performed. The patients presenting problems have been discussed, and they are in agreement with the care plan formulated and outlined with them. I have encouraged them to ask questions as they arise throughout their visit. Progress note:  Patient has been reassessed and reports feeling considerably better, has normal vital signs and feels comfortable going home. I think this is reasonable as no findings today suggest a life-threatening condition.      DISPOSITION: DISCHARGE  The patient's results have been reviewed with patient and available family and/or caregiver. They verbally convey their understanding and agreement of the patient's signs, symptoms, diagnosis, treatment and prognosis and additionally agree to follow up as recommended in the discharge instructions or to return to the Emergency Department should the patient's condition change prior to their follow-up appointment. The patient and available family and/or caregiver verbally agree with the care plan and all of their questions have been answered. The discharge instructions have also been provided to the them with educational information regarding the patient's diagnosis as well a list of reasons why the patient would want to return to the ER prior to their follow-up appointment should any concerns arise, the patient's condition change or symptoms worsen. Lia Barron MD, Msc    PLAN:  Current Discharge Medication List        START taking these medications    Details   terbinafine HCL (LAMISIL) 250 mg tablet Take 1 Tablet by mouth in the morning for 14 days. Qty: 14 Tablet, Refills: 0  Start date: 7/27/2022, End date: 8/10/2022           2. Follow-up Information       Follow up With Specialties Details Why Contact Info    Your primary care doctor  Call in 1 day schedule a follow up visit this or early next week     Joanne Abdalla DPM Podiatry Schedule an appointment as soon as possible for a visit   [address] [place] [address]  [phone]            3. Return to ED if worse       IHansa MD, am the attending of record for this patient encounter. Diagnosis     Clinical Impression:   1. Onychomadesis of toenail        Attestation:  I personally performed the services described in this documentation on this date 7/27/2022 for patient Jeffery Mendoza.   Lia Barron MD

## 2022-07-27 NOTE — ED TRIAGE NOTES
Pt complaining of an \"ingrown toenail\" of her left big toe x 2 weeks. Pt reports the toe is leaking pus. Pt reports she tried to 'fix it\" but was unable to.

## 2022-07-27 NOTE — DISCHARGE INSTRUCTIONS
Call your doctor during next office hours and update them on your symptoms and on your visit with us today. Let your doctor know the results of any tests that were done and your diagnosis. Schedule a follow up visit (virtual or in person) in 3-5 days so that you can be reevaluated. -- The oral medications for your fungal infection need to be taken longer than the 14 days we prescribed. Your doctor can ensure that you have enough refills. -- We also recommend that you see a podiatrist. Your doctor can help set up this appointment or you can follow up with clinic we have referred you to.   -- please read the attached care instructions

## 2022-07-27 NOTE — ED NOTES

## 2022-07-27 NOTE — ED NOTES
Discharge instructions were given to the patient by Jimmy Mosqueda RN. The patient left the Emergency Department ambulatory, alert and oriented and in no acute distress with 1 prescriptions. The patient was encouraged to call or return to the ED for worsening issues or problems and was encouraged to schedule a follow up appointment for continuing care. The patient verbalized understanding of discharge instructions and prescriptions, all questions were answered. The patient has no further concerns at this time.

## 2023-05-21 RX ORDER — ASPIRIN 81 MG/1
81 TABLET, CHEWABLE ORAL DAILY
COMMUNITY
Start: 2021-11-09

## 2023-05-21 RX ORDER — METHADONE HYDROCHLORIDE 10 MG/ML
115 CONCENTRATE ORAL DAILY
COMMUNITY

## 2023-05-21 RX ORDER — NAPROXEN 500 MG/1
TABLET ORAL
COMMUNITY
Start: 2021-11-15

## 2024-11-30 ENCOUNTER — HOSPITAL ENCOUNTER (EMERGENCY)
Facility: HOSPITAL | Age: 49
Discharge: HOME | End: 2024-11-30
Attending: STUDENT IN AN ORGANIZED HEALTH CARE EDUCATION/TRAINING PROGRAM
Payer: MEDICAID

## 2024-11-30 VITALS
HEIGHT: 62 IN | SYSTOLIC BLOOD PRESSURE: 170 MMHG | DIASTOLIC BLOOD PRESSURE: 104 MMHG | WEIGHT: 150.5 LBS | BODY MASS INDEX: 27.7 KG/M2 | HEART RATE: 65 BPM | TEMPERATURE: 98.1 F | OXYGEN SATURATION: 99 % | RESPIRATION RATE: 20 BRPM

## 2024-11-30 DIAGNOSIS — I10 HYPERTENSION, UNSPECIFIED TYPE: ICD-10-CM

## 2024-11-30 DIAGNOSIS — T40.601A OPIATE OVERDOSE, ACCIDENTAL OR UNINTENTIONAL, INITIAL ENCOUNTER (HCC): Primary | ICD-10-CM

## 2024-11-30 PROCEDURE — 96375 TX/PRO/DX INJ NEW DRUG ADDON: CPT

## 2024-11-30 PROCEDURE — 96372 THER/PROPH/DIAG INJ SC/IM: CPT

## 2024-11-30 PROCEDURE — 6360000002 HC RX W HCPCS: Performed by: STUDENT IN AN ORGANIZED HEALTH CARE EDUCATION/TRAINING PROGRAM

## 2024-11-30 PROCEDURE — 99284 EMERGENCY DEPT VISIT MOD MDM: CPT

## 2024-11-30 PROCEDURE — 96374 THER/PROPH/DIAG INJ IV PUSH: CPT

## 2024-11-30 PROCEDURE — 6370000000 HC RX 637 (ALT 250 FOR IP): Performed by: STUDENT IN AN ORGANIZED HEALTH CARE EDUCATION/TRAINING PROGRAM

## 2024-11-30 RX ORDER — ONDANSETRON 4 MG/1
4 TABLET, ORALLY DISINTEGRATING ORAL 3 TIMES DAILY PRN
Qty: 21 TABLET | Refills: 0 | Status: SHIPPED | OUTPATIENT
Start: 2024-11-30

## 2024-11-30 RX ORDER — KETOROLAC TROMETHAMINE 30 MG/ML
30 INJECTION, SOLUTION INTRAMUSCULAR; INTRAVENOUS
Status: COMPLETED | OUTPATIENT
Start: 2024-11-30 | End: 2024-11-30

## 2024-11-30 RX ORDER — KETOROLAC TROMETHAMINE 10 MG/1
10 TABLET, FILM COATED ORAL EVERY 6 HOURS PRN
Qty: 20 TABLET | Refills: 0 | Status: SHIPPED | OUTPATIENT
Start: 2024-11-30

## 2024-11-30 RX ORDER — NALOXONE HYDROCHLORIDE 0.4 MG/ML
0.4 INJECTION, SOLUTION INTRAMUSCULAR; INTRAVENOUS; SUBCUTANEOUS
Status: COMPLETED | OUTPATIENT
Start: 2024-11-30 | End: 2024-11-30

## 2024-11-30 RX ORDER — KETOROLAC TROMETHAMINE 30 MG/ML
15 INJECTION, SOLUTION INTRAMUSCULAR; INTRAVENOUS ONCE
Status: DISCONTINUED | OUTPATIENT
Start: 2024-11-30 | End: 2024-11-30

## 2024-11-30 RX ORDER — ONDANSETRON 4 MG/1
4 TABLET, ORALLY DISINTEGRATING ORAL ONCE
Status: COMPLETED | OUTPATIENT
Start: 2024-11-30 | End: 2024-11-30

## 2024-11-30 RX ORDER — ONDANSETRON 2 MG/ML
4 INJECTION INTRAMUSCULAR; INTRAVENOUS ONCE
Status: COMPLETED | OUTPATIENT
Start: 2024-11-30 | End: 2024-11-30

## 2024-11-30 RX ADMIN — NALXONE HYDROCHLORIDE 0.4 MG: 0.4 INJECTION INTRAMUSCULAR; INTRAVENOUS; SUBCUTANEOUS at 15:03

## 2024-11-30 RX ADMIN — ONDANSETRON 4 MG: 4 TABLET, ORALLY DISINTEGRATING ORAL at 16:08

## 2024-11-30 RX ADMIN — ONDANSETRON 4 MG: 2 INJECTION, SOLUTION INTRAMUSCULAR; INTRAVENOUS at 15:03

## 2024-11-30 RX ADMIN — KETOROLAC TROMETHAMINE 30 MG: 30 INJECTION, SOLUTION INTRAMUSCULAR at 17:43

## 2024-11-30 ASSESSMENT — PAIN - FUNCTIONAL ASSESSMENT: PAIN_FUNCTIONAL_ASSESSMENT: NONE - DENIES PAIN

## 2024-11-30 NOTE — ED NOTES
Patient (s)   given copy of dc instructions and   script(s).  Patient (s) self verbalized understanding of instructions and script (s).  Patient given a current medication reconciliation form and verbalized understanding of their medications.   Patient (s) verbalized understanding of the importance of discussing medications with  his or her physician or clinic they will be following up with.  Patient alert and oriented and in no acute distress.  Patient discharged home ambulatory with self.

## 2024-11-30 NOTE — ED NOTES
Patient brought here by EMS with c/o overdose.  EMS reports that patient said she has hx of drug abuse, reportedly used heroin today.  Patient arrives drowsy but initially arousable to verbal, respirations even and not in distress.  EMS report that they gave patient 2mg of Narcan nasally, deny having to bag patient at any time, however they state they gave her 2L oxygen through nasal cannula \"for comfort.\"  Patient denies pain, denies suicidal thoughts.  Patient however during initially assessment went from drowsy to quickly unresponsive to verbal or physical, though still with normal respirations and no changes to her vital signs.  Patient with 20g PIV in left AC started br EMS prior to arrival.          Emergency Department Nursing Plan of Care       The Nursing Plan of Care is developed from the Nursing assessment and Emergency Department Attending provider initial evaluation.  The plan of care may be reviewed in the “ED Provider note”.      The Plan of Care was developed with the following considerations:  Patient / Family readiness to learn indicated by:verbalized understanding  Persons(s) to be included in education: patient  Barriers to Learning/Limitations:None      Signed     Anna Sandoval RN    11/30/2024   2:56 PM

## 2024-11-30 NOTE — ED NOTES
Patient found in the bed with slight amount of blood on her arm, patient removed her IV and threw her blanket on the ground and removed all the monitoring equipment.  Patient states that she still feels nauseous, patient asking for blanket and stating she needs to urinate.  Patient reconnected to O2 probe and blanket placed back on patient and patient nurse aid to bring bedside commode to patient's room.

## 2024-11-30 NOTE — DISCHARGE INSTRUCTIONS
Please make a followup with a primary care physician.  If you have any new or worsening concerning medical symptoms please return to the emergency department.    Substance Abuse and Addiction Resources    [facility]  [phone]  Closed meeting- family members that have been affected bysomeone alcohol abuse  Open meeting everyone can attend.    [facility] Anonymous [phone]  Non professional (alcoholics in recovery helping others)  Hold Meetings (talk about sobriety, have desire)  No charge    [facility] [phone]  Juan Carlos Bower is the Treatment Consultant in the [facility]  Free one-on-one phone consultation and insurance verification  12 step based meetings and philosophy  Family programs and sessions    [facility] [phone]  Free individual assessment  Intensive outpatient outpatient program  Ambulatory withdrawal management/detoxification  Insurance required    [facility]  [phone] ([place] location), [phone] ([place] location)  An Office Based Opioid Treatment Program  Individual and Group therapy, Peer Recovery Services and Care Coordination  Accepting Medicare, Medicaid and Commercial/private insurance  $200 a month self-pay program (does not include medicine or outside labs)  [address], [address], [facility] [address] (Monday - Friday, 9a-5p.  [facility] ED referral appointment 10:00-11:00 Monday)  [address], [address], [place] [address] (Tuesday - Friday, 9a-5p [facility] ED referral appointment 10:00-11:00 Tuesday - Friday)    [facility] [phone]  Good for patients with no insurance, Medicaid, Medicare  Sliding fee scale available for self pay  Patients go Monday-Friday from 8-4:30 to central intake for a shelter and then to [facility] for services  Offers a variety of services I.e. Laundry, shower, eye clinic, medical clinic, dental, substance abuse services, case management, etc.    Drug and  insurance allowed.  No patients on prescribed narcotics. No sex offenders.  Need all medical mental health information and medication list sent prior to admit.    [facility] [facility] [phone]  Accepts MALES between 21-65  Need social security card and picture ID  Must pass breath test and 10 panel Urine Drug Screen  No Sex Offenders or Psychiatric patients  Must not have been a 3x repeat at this facility  6 month in house- has to participate in work therapy 40 hours/week  Participates in spiritual classes, bible study and Christian    [facility] [phone]    [facility] [phone]  Private Pay, sent by Community Services Board  No Sex Offenders    Community Services Boards (by Region)    [facility]  [phone]  Monday through Friday 8:30am to 5:00pm  Brief Telephone Interview. Then will be scheduled for next substance abuse services orientation group and then scheduled for intake interview.    [facility]  [phone]  Walk in Monday through Friday 9:00AM to 3:00PM  Bring Picture ID, Proof of residence (piece of mail), Proof of Insurance (Medicaid/sliding scale), Proof of income (any)    [facility] [phone]  Walk in then Assessment by licensed professional   East office ([facility]) Monday, Tuesday, Thursday  9AM to 3PM.   West office ([address] [place] [address], [address]) Monday-Thursday 9AM - 3PM.     [facility]  [phone]  Walk In Monday to Friday starting at 8AM  Bring Picture ID, Proof of residence (piece of mail), Proof of insurance (Medicaid/sliding scale)  At 9AM is the Substance Abuse Services Orientation Group and then scheduled for Intake Evaluation.     [place] [address], [place] [id number] [phone]

## 2024-11-30 NOTE — ED PROVIDER NOTES
[facility] EMERGENCY DEPT  EMERGENCY DEPARTMENT ENCOUNTER       Pt Name: Christa Alexander  MRN: [id number]  Birthdate 1975  Date of evaluation: 11/30/2024  Provider: El Jolly MD   PCP: No primary care provider on file.  Note Started: 4:11 PM EST 11/30/24     CHIEF COMPLAINT       Chief Complaint   Patient presents with    Drug Overdose     Heroin, given 2mg nasal narcan      HISTORY OF PRESENT ILLNESS: 1 or more elements      History From: patient, History limited by: none     Christa Alexander is a 49 y.o. female with a history of hepatitis C and opioid use disorder presents to the emergency department with EMS following an overdose earlier today.  She was found by EMS to be drowsy and was initially verbal, but EMS gave 2 mg of Narcan.  They placed 2L of O2 via NCA and brought patient to the ED.  Somnolent on my interview and not responding to verbal or motor stimulation.    Please See MDM for Additional Details of the HPI/PMH  Nursing Notes were all reviewed and agreed with or any disagreements were addressed in the HPI.     REVIEW OF SYSTEMS        Positives and Pertinent negatives as per HPI.    PAST HISTORY     Past Medical History:  Past Medical History:   Diagnosis Date    Hepatitis C     Hepatitis C     Infectious disease     hepatitis C       Past Surgical History:  History reviewed. No pertinent surgical history.    Family History:  Family History   Problem Relation Age of Onset    Cancer Mother        Social History:  Social History     Tobacco Use    Smoking status: Every Day     Current packs/day: 1.00     Types: Cigarettes    Smokeless tobacco: Never   Substance Use Topics    Alcohol use: No    Drug use: Yes     Types: Cocaine       Allergies:  Allergies   Allergen Reactions    Tramadol Other (See Comments)     05/17/19 - Pt denies allergy to this.     Hydrocodone-Acetaminophen Rash     Pt states she had Lortab recently and did not have a reaction. Pt denies.  05/17/19- Pt denies allergy to